# Patient Record
Sex: FEMALE | Race: BLACK OR AFRICAN AMERICAN | NOT HISPANIC OR LATINO | Employment: UNEMPLOYED
[De-identification: names, ages, dates, MRNs, and addresses within clinical notes are randomized per-mention and may not be internally consistent; named-entity substitution may affect disease eponyms.]

---

## 2019-12-31 ENCOUNTER — HOSPITAL (OUTPATIENT)
Dept: OTHER | Age: 24
End: 2019-12-31

## 2020-04-14 ENCOUNTER — INITIAL PRENATAL (OUTPATIENT)
Dept: OBGYN CLINIC | Facility: CLINIC | Age: 25
End: 2020-04-14
Payer: MEDICAID

## 2020-04-14 ENCOUNTER — ULTRASOUND ENCOUNTER (OUTPATIENT)
Dept: OBGYN CLINIC | Facility: CLINIC | Age: 25
End: 2020-04-14
Payer: MEDICAID

## 2020-04-14 VITALS
HEIGHT: 60 IN | HEART RATE: 89 BPM | WEIGHT: 150 LBS | BODY MASS INDEX: 29.45 KG/M2 | SYSTOLIC BLOOD PRESSURE: 120 MMHG | DIASTOLIC BLOOD PRESSURE: 84 MMHG

## 2020-04-14 DIAGNOSIS — Z34.01 ENCOUNTER FOR PRENATAL CARE IN FIRST TRIMESTER OF FIRST PREGNANCY: ICD-10-CM

## 2020-04-14 DIAGNOSIS — O36.80X0 PREGNANCY WITH UNCERTAIN FETAL VIABILITY, SINGLE OR UNSPECIFIED FETUS: ICD-10-CM

## 2020-04-14 DIAGNOSIS — Z12.4 SCREENING FOR MALIGNANT NEOPLASM OF CERVIX: Primary | ICD-10-CM

## 2020-04-14 DIAGNOSIS — O36.80X0 PREGNANCY WITH UNCERTAIN FETAL VIABILITY, SINGLE OR UNSPECIFIED FETUS: Primary | ICD-10-CM

## 2020-04-14 PROCEDURE — 87591 N.GONORRHOEAE DNA AMP PROB: CPT | Performed by: OBSTETRICS & GYNECOLOGY

## 2020-04-14 PROCEDURE — 76801 OB US < 14 WKS SINGLE FETUS: CPT | Performed by: OBSTETRICS & GYNECOLOGY

## 2020-04-14 PROCEDURE — 87491 CHLMYD TRACH DNA AMP PROBE: CPT | Performed by: OBSTETRICS & GYNECOLOGY

## 2020-04-14 PROCEDURE — 87086 URINE CULTURE/COLONY COUNT: CPT | Performed by: OBSTETRICS & GYNECOLOGY

## 2020-04-14 PROCEDURE — 81002 URINALYSIS NONAUTO W/O SCOPE: CPT | Performed by: OBSTETRICS & GYNECOLOGY

## 2020-04-14 PROCEDURE — 88175 CYTOPATH C/V AUTO FLUID REDO: CPT | Performed by: OBSTETRICS & GYNECOLOGY

## 2020-04-14 PROCEDURE — 0500F INITIAL PRENATAL CARE VISIT: CPT | Performed by: OBSTETRICS & GYNECOLOGY

## 2020-04-14 NOTE — PROGRESS NOTES
transabdominal us performed  single viable iup at 8w4d  s=d  ys present  mpn=004 bpm  bilateral ovaries appear within normal limits    taken any medicines during this pregnancy is her first pregnancy she is having some headaches but minimal nausea.   She ha

## 2020-04-17 NOTE — PROGRESS NOTES
Patient aware of results and recommendation to re swab. Patient scheduled for 4/21/2020. Patient verbalized understanding.

## 2020-04-21 ENCOUNTER — LAB ENCOUNTER (OUTPATIENT)
Dept: LAB | Facility: HOSPITAL | Age: 25
End: 2020-04-21
Attending: OBSTETRICS & GYNECOLOGY
Payer: MEDICAID

## 2020-04-21 ENCOUNTER — OFFICE VISIT (OUTPATIENT)
Dept: OBGYN CLINIC | Facility: CLINIC | Age: 25
End: 2020-04-21
Payer: MEDICAID

## 2020-04-21 VITALS
DIASTOLIC BLOOD PRESSURE: 68 MMHG | HEART RATE: 80 BPM | WEIGHT: 153 LBS | SYSTOLIC BLOOD PRESSURE: 118 MMHG | HEIGHT: 60 IN | BODY MASS INDEX: 30.04 KG/M2

## 2020-04-21 DIAGNOSIS — O36.80X0 PREGNANCY WITH UNCERTAIN FETAL VIABILITY, SINGLE OR UNSPECIFIED FETUS: ICD-10-CM

## 2020-04-21 DIAGNOSIS — N76.0 VAGINITIS AND VULVOVAGINITIS: Primary | ICD-10-CM

## 2020-04-21 PROCEDURE — 87510 GARDNER VAG DNA DIR PROBE: CPT | Performed by: OBSTETRICS & GYNECOLOGY

## 2020-04-21 PROCEDURE — 86780 TREPONEMA PALLIDUM: CPT

## 2020-04-21 PROCEDURE — 87340 HEPATITIS B SURFACE AG IA: CPT

## 2020-04-21 PROCEDURE — 86850 RBC ANTIBODY SCREEN: CPT

## 2020-04-21 PROCEDURE — 86900 BLOOD TYPING SEROLOGIC ABO: CPT

## 2020-04-21 PROCEDURE — 86901 BLOOD TYPING SEROLOGIC RH(D): CPT

## 2020-04-21 PROCEDURE — 87480 CANDIDA DNA DIR PROBE: CPT | Performed by: OBSTETRICS & GYNECOLOGY

## 2020-04-21 PROCEDURE — 86762 RUBELLA ANTIBODY: CPT

## 2020-04-21 PROCEDURE — 36415 COLL VENOUS BLD VENIPUNCTURE: CPT

## 2020-04-21 PROCEDURE — 85025 COMPLETE CBC W/AUTO DIFF WBC: CPT

## 2020-04-21 PROCEDURE — 99212 OFFICE O/P EST SF 10 MIN: CPT | Performed by: OBSTETRICS & GYNECOLOGY

## 2020-04-21 PROCEDURE — 87389 HIV-1 AG W/HIV-1&-2 AB AG IA: CPT

## 2020-04-21 PROCEDURE — 87660 TRICHOMONAS VAGIN DIR PROBE: CPT | Performed by: OBSTETRICS & GYNECOLOGY

## 2020-04-21 NOTE — PROGRESS NOTES
The Pap smear done which showed some possible trichomonas she is not symptomatic no vaginal itching discharge at all. At her blood work done today. She we have prenatal vitamins were discussed that they do not have any iron.   General genitalia without le

## 2020-04-21 NOTE — PROGRESS NOTES
Patient aware of results and states that she talked with doctor today at her visit and her PNV do not have iron in them but she did receive ones from our office that do contain iron.  Informed that if she does want to take the ones she has she can always ta

## 2020-04-24 ENCOUNTER — TELEPHONE (OUTPATIENT)
Dept: OBGYN CLINIC | Facility: CLINIC | Age: 25
End: 2020-04-24

## 2020-04-24 RX ORDER — METRONIDAZOLE 250 MG/1
250 TABLET ORAL 3 TIMES DAILY
Qty: 21 TABLET | Refills: 0 | Status: SHIPPED | OUTPATIENT
Start: 2020-04-24 | End: 2020-05-01

## 2020-04-24 NOTE — TELEPHONE ENCOUNTER
----- Message from Olga Caller sent at 4/23/2020  4:16 PM CDT -----  Regarding: pt returning your call  Pt is just returning your call, please call her at this # [phone].  Thanks

## 2020-04-24 NOTE — PROGRESS NOTES
Patient aware of results and recommendations for flagyl 250 TID X 7 days . Patient verbalizes understanding.

## 2020-05-12 ENCOUNTER — ROUTINE PRENATAL (OUTPATIENT)
Dept: OBGYN CLINIC | Facility: CLINIC | Age: 25
End: 2020-05-12
Payer: MEDICAID

## 2020-05-12 ENCOUNTER — ULTRASOUND ENCOUNTER (OUTPATIENT)
Dept: OBGYN CLINIC | Facility: CLINIC | Age: 25
End: 2020-05-12
Payer: MEDICAID

## 2020-05-12 VITALS
DIASTOLIC BLOOD PRESSURE: 68 MMHG | HEIGHT: 60 IN | WEIGHT: 159 LBS | SYSTOLIC BLOOD PRESSURE: 104 MMHG | BODY MASS INDEX: 31.22 KG/M2

## 2020-05-12 DIAGNOSIS — Z36.82 ENCOUNTER FOR NUCHAL TRANSLUCENCY TESTING: Primary | ICD-10-CM

## 2020-05-12 DIAGNOSIS — Z3A.12 12 WEEKS GESTATION OF PREGNANCY: ICD-10-CM

## 2020-05-12 DIAGNOSIS — O99.011 ANEMIA AFFECTING PREGNANCY IN FIRST TRIMESTER: Primary | ICD-10-CM

## 2020-05-12 DIAGNOSIS — Z13.79 ENCOUNTER FOR OTHER SCREENING FOR GENETIC AND CHROMOSOMAL ANOMALIES: ICD-10-CM

## 2020-05-12 DIAGNOSIS — Z13.71 SCREENING FOR GENETIC DISEASE CARRIER STATUS: ICD-10-CM

## 2020-05-12 DIAGNOSIS — Z36.0 ENCOUNTER FOR ANTENATAL SCREENING FOR CHROMOSOMAL ANOMALIES: ICD-10-CM

## 2020-05-12 PROCEDURE — 81002 URINALYSIS NONAUTO W/O SCOPE: CPT | Performed by: OBSTETRICS & GYNECOLOGY

## 2020-05-12 PROCEDURE — 0502F SUBSEQUENT PRENATAL CARE: CPT | Performed by: OBSTETRICS & GYNECOLOGY

## 2020-05-12 NOTE — PROGRESS NOTES
ISABEL     No cramping or vaginal bleeding  No nausea  Minor self-limited headaches    31year old  at 12w4d   NORAH 20 by LMP c/w 8w4d US  B+  Aneuploidy screening  -NT done today - prelim negative.  Discussed Dr. Arelis Stinson has to sign off  -cfDNA ord

## 2020-05-12 NOTE — PATIENT INSTRUCTIONS
Please consider if you would like an AFP level checked (optional) - for more information, see below. Please schedule a \"20 week\" fetal anatomy scan with the  of our office.      AFP Level   There is an optional blood test that we can perform

## 2020-05-20 ENCOUNTER — TELEPHONE (OUTPATIENT)
Dept: OBGYN CLINIC | Facility: CLINIC | Age: 25
End: 2020-05-20

## 2020-05-20 NOTE — PROGRESS NOTES
Prequel Prenatal Screen = Negative for Trisomy 15, 18 and 21  Predicted Fetal Sex available per paper record

## 2020-06-09 ENCOUNTER — TELEPHONE (OUTPATIENT)
Dept: OBGYN CLINIC | Facility: CLINIC | Age: 25
End: 2020-06-09

## 2020-06-09 DIAGNOSIS — Z36.89 ENCOUNTER FOR FETAL ANATOMIC SURVEY: Primary | ICD-10-CM

## 2020-06-09 NOTE — TELEPHONE ENCOUNTER
Per pt she called the medicaid enrollment office and was told that if she wanted to change the medical plan she will have to wait until 07-; but because dr Vicky Hernandez is considered an specialist she needed to go see her pcp first. She is still with the

## 2020-06-10 NOTE — TELEPHONE ENCOUNTER
Submitted prior auth via [employer] for continuity of care - 2 prenatal visits and 20 week ultrasound 91518. Currently under review.  Confirmation [id number], Ref# [id number]

## 2020-06-24 NOTE — TELEPHONE ENCOUNTER
Call placed to [place] to follow up on this request, per Lorenza De La Cruz in customer service, all services were approved:  2 units of 0502F  1 unit of [id number]  Approval #[id number]      Call placed to patient to inform her of the above.  Pt scheduled for 6/30/20 for ob v

## 2020-06-30 ENCOUNTER — ULTRASOUND ENCOUNTER (OUTPATIENT)
Dept: OBGYN CLINIC | Facility: CLINIC | Age: 25
End: 2020-06-30
Payer: MEDICAID

## 2020-06-30 ENCOUNTER — ROUTINE PRENATAL (OUTPATIENT)
Dept: OBGYN CLINIC | Facility: CLINIC | Age: 25
End: 2020-06-30
Payer: MEDICAID

## 2020-06-30 VITALS
SYSTOLIC BLOOD PRESSURE: 102 MMHG | HEIGHT: 60 IN | BODY MASS INDEX: 34.75 KG/M2 | WEIGHT: 177 LBS | DIASTOLIC BLOOD PRESSURE: 60 MMHG

## 2020-06-30 DIAGNOSIS — Z36.89 ENCOUNTER FOR FETAL ANATOMIC SURVEY: ICD-10-CM

## 2020-06-30 DIAGNOSIS — Z34.02 ENCOUNTER FOR SUPERVISION OF NORMAL FIRST PREGNANCY IN SECOND TRIMESTER: Primary | ICD-10-CM

## 2020-06-30 PROCEDURE — 76805 OB US >/= 14 WKS SNGL FETUS: CPT | Performed by: OBSTETRICS & GYNECOLOGY

## 2020-06-30 PROCEDURE — 0502F SUBSEQUENT PRENATAL CARE: CPT | Performed by: OBSTETRICS & GYNECOLOGY

## 2020-06-30 PROCEDURE — 81002 URINALYSIS NONAUTO W/O SCOPE: CPT | Performed by: OBSTETRICS & GYNECOLOGY

## 2020-06-30 NOTE — PROGRESS NOTES
single viable iup at 31L3M  cephalic  NAC=2.7 cm  ant placenta  s=d  vqt=567 bpm  normal anatomy and carol

## 2020-07-02 ENCOUNTER — TELEPHONE (OUTPATIENT)
Dept: OBGYN CLINIC | Facility: CLINIC | Age: 25
End: 2020-07-02

## 2020-07-02 NOTE — TELEPHONE ENCOUNTER
Need a letter for work stating  pt was in the office on Tuesday 06/30.had ob appt w/ .pt is able  to work w/o any  Restrictions. please send letter via my chart.

## 2020-07-28 ENCOUNTER — ROUTINE PRENATAL (OUTPATIENT)
Dept: OBGYN CLINIC | Facility: CLINIC | Age: 25
End: 2020-07-28
Payer: MEDICAID

## 2020-07-28 ENCOUNTER — LAB ENCOUNTER (OUTPATIENT)
Dept: LAB | Facility: HOSPITAL | Age: 25
End: 2020-07-28
Attending: OBSTETRICS & GYNECOLOGY
Payer: MEDICAID

## 2020-07-28 VITALS
SYSTOLIC BLOOD PRESSURE: 115 MMHG | BODY MASS INDEX: 36.12 KG/M2 | DIASTOLIC BLOOD PRESSURE: 62 MMHG | WEIGHT: 184 LBS | HEIGHT: 60 IN

## 2020-07-28 DIAGNOSIS — O99.012 ANEMIA COMPLICATING PREGNANCY IN SECOND TRIMESTER: ICD-10-CM

## 2020-07-28 DIAGNOSIS — Z34.02 ENCOUNTER FOR SUPERVISION OF NORMAL FIRST PREGNANCY IN SECOND TRIMESTER: Primary | ICD-10-CM

## 2020-07-28 DIAGNOSIS — Z3A.23 23 WEEKS GESTATION OF PREGNANCY: ICD-10-CM

## 2020-07-28 DIAGNOSIS — Z34.02 ENCOUNTER FOR SUPERVISION OF NORMAL FIRST PREGNANCY IN SECOND TRIMESTER: ICD-10-CM

## 2020-07-28 LAB
BASOPHILS # BLD AUTO: 0.03 X10(3) UL (ref 0–0.2)
BASOPHILS NFR BLD AUTO: 0.3 %
DEPRECATED RDW RBC AUTO: 45.1 FL (ref 35.1–46.3)
EOSINOPHIL # BLD AUTO: 0.08 X10(3) UL (ref 0–0.7)
EOSINOPHIL NFR BLD AUTO: 0.7 %
ERYTHROCYTE [DISTWIDTH] IN BLOOD BY AUTOMATED COUNT: 13.1 % (ref 11–15)
GLUCOSE 1H P GLC SERPL-MCNC: 94 MG/DL
HCT VFR BLD AUTO: 32.7 % (ref 35–48)
HGB BLD-MCNC: 10.8 G/DL (ref 12–16)
IMM GRANULOCYTES # BLD AUTO: 0.11 X10(3) UL (ref 0–1)
IMM GRANULOCYTES NFR BLD: 1 %
LYMPHOCYTES # BLD AUTO: 1.73 X10(3) UL (ref 1–4)
LYMPHOCYTES NFR BLD AUTO: 15.6 %
MCH RBC QN AUTO: 31.5 PG (ref 26–34)
MCHC RBC AUTO-ENTMCNC: 33 G/DL (ref 31–37)
MCV RBC AUTO: 95.3 FL (ref 80–100)
MONOCYTES # BLD AUTO: 0.89 X10(3) UL (ref 0.1–1)
MONOCYTES NFR BLD AUTO: 8 %
MULTISTIX EXPIRATION DATE: NORMAL DATE
MULTISTIX LOT#: NORMAL NUMERIC
NEUTROPHILS # BLD AUTO: 8.22 X10 (3) UL (ref 1.5–7.7)
NEUTROPHILS # BLD AUTO: 8.22 X10(3) UL (ref 1.5–7.7)
NEUTROPHILS NFR BLD AUTO: 74.4 %
PLATELET # BLD AUTO: 220 10(3)UL (ref 150–450)
RBC # BLD AUTO: 3.43 X10(6)UL (ref 3.8–5.3)
WBC # BLD AUTO: 11.1 X10(3) UL (ref 4–11)

## 2020-07-28 PROCEDURE — 81002 URINALYSIS NONAUTO W/O SCOPE: CPT | Performed by: OBSTETRICS & GYNECOLOGY

## 2020-07-28 PROCEDURE — 3008F BODY MASS INDEX DOCD: CPT | Performed by: OBSTETRICS & GYNECOLOGY

## 2020-07-28 PROCEDURE — 36415 COLL VENOUS BLD VENIPUNCTURE: CPT

## 2020-07-28 PROCEDURE — 85025 COMPLETE CBC W/AUTO DIFF WBC: CPT

## 2020-07-28 PROCEDURE — 82950 GLUCOSE TEST: CPT

## 2020-07-28 PROCEDURE — 0502F SUBSEQUENT PRENATAL CARE: CPT | Performed by: OBSTETRICS & GYNECOLOGY

## 2020-07-28 PROCEDURE — 3078F DIAST BP <80 MM HG: CPT | Performed by: OBSTETRICS & GYNECOLOGY

## 2020-07-28 PROCEDURE — 3074F SYST BP LT 130 MM HG: CPT | Performed by: OBSTETRICS & GYNECOLOGY

## 2020-07-28 PROCEDURE — 82728 ASSAY OF FERRITIN: CPT

## 2020-07-29 PROBLEM — O99.012 ANEMIA AFFECTING PREGNANCY IN SECOND TRIMESTER (HCC): Status: ACTIVE | Noted: 2020-05-12

## 2020-07-29 PROBLEM — Z34.02 ENCOUNTER FOR PRENATAL CARE IN SECOND TRIMESTER OF FIRST PREGNANCY: Status: ACTIVE | Noted: 2020-04-14

## 2020-07-29 PROBLEM — Z34.02: Status: ACTIVE | Noted: 2020-04-14

## 2020-07-29 PROBLEM — O99.012 ANEMIA AFFECTING PREGNANCY IN SECOND TRIMESTER: Status: ACTIVE | Noted: 2020-05-12

## 2020-07-29 LAB — DEPRECATED HBV CORE AB SER IA-ACNC: 6.5 NG/ML (ref 12–114)

## 2020-07-30 PROBLEM — D50.9 IRON DEFICIENCY ANEMIA DURING PREGNANCY: Status: ACTIVE | Noted: 2020-05-12

## 2020-07-30 PROBLEM — O99.019 IRON DEFICIENCY ANEMIA DURING PREGNANCY: Status: ACTIVE | Noted: 2020-05-12

## 2020-07-30 PROBLEM — O99.019 IRON DEFICIENCY ANEMIA DURING PREGNANCY (HCC): Status: ACTIVE | Noted: 2020-05-12

## 2020-07-30 PROBLEM — D50.9 IRON DEFICIENCY ANEMIA DURING PREGNANCY (HCC): Status: ACTIVE | Noted: 2020-05-12

## 2020-08-20 ENCOUNTER — ROUTINE PRENATAL (OUTPATIENT)
Dept: OBGYN CLINIC | Facility: CLINIC | Age: 25
End: 2020-08-20
Payer: MEDICAID

## 2020-08-20 VITALS
HEART RATE: 99 BPM | DIASTOLIC BLOOD PRESSURE: 66 MMHG | WEIGHT: 192 LBS | BODY MASS INDEX: 37.69 KG/M2 | HEIGHT: 60 IN | SYSTOLIC BLOOD PRESSURE: 110 MMHG

## 2020-08-20 DIAGNOSIS — Z34.02 ENCOUNTER FOR SUPERVISION OF NORMAL FIRST PREGNANCY IN SECOND TRIMESTER: Primary | ICD-10-CM

## 2020-08-20 DIAGNOSIS — Z3A.26 26 WEEKS GESTATION OF PREGNANCY: ICD-10-CM

## 2020-08-20 LAB
MULTISTIX LOT#: NORMAL NUMERIC
PROTEIN (URINE DIPSTICK): NEGATIVE MG/DL

## 2020-08-20 PROCEDURE — 3078F DIAST BP <80 MM HG: CPT | Performed by: OBSTETRICS & GYNECOLOGY

## 2020-08-20 PROCEDURE — 0502F SUBSEQUENT PRENATAL CARE: CPT | Performed by: OBSTETRICS & GYNECOLOGY

## 2020-08-20 PROCEDURE — 3074F SYST BP LT 130 MM HG: CPT | Performed by: OBSTETRICS & GYNECOLOGY

## 2020-08-20 PROCEDURE — 3008F BODY MASS INDEX DOCD: CPT | Performed by: OBSTETRICS & GYNECOLOGY

## 2020-08-20 PROCEDURE — 81002 URINALYSIS NONAUTO W/O SCOPE: CPT | Performed by: OBSTETRICS & GYNECOLOGY

## 2020-09-01 ENCOUNTER — LAB ENCOUNTER (OUTPATIENT)
Dept: LAB | Facility: HOSPITAL | Age: 25
End: 2020-09-01
Attending: OBSTETRICS & GYNECOLOGY
Payer: MEDICAID

## 2020-09-01 DIAGNOSIS — O99.012 ANEMIA COMPLICATING PREGNANCY IN SECOND TRIMESTER: ICD-10-CM

## 2020-09-01 DIAGNOSIS — Z34.02 ENCOUNTER FOR SUPERVISION OF NORMAL FIRST PREGNANCY IN SECOND TRIMESTER: ICD-10-CM

## 2020-09-01 LAB
BASOPHILS # BLD AUTO: 0.04 X10(3) UL (ref 0–0.2)
BASOPHILS NFR BLD AUTO: 0.4 %
DEPRECATED RDW RBC AUTO: 46.1 FL (ref 35.1–46.3)
EOSINOPHIL # BLD AUTO: 0.09 X10(3) UL (ref 0–0.7)
EOSINOPHIL NFR BLD AUTO: 0.9 %
ERYTHROCYTE [DISTWIDTH] IN BLOOD BY AUTOMATED COUNT: 12.9 % (ref 11–15)
HCT VFR BLD AUTO: 34.4 % (ref 35–48)
HGB BLD-MCNC: 10.9 G/DL (ref 12–16)
IMM GRANULOCYTES # BLD AUTO: 0.13 X10(3) UL (ref 0–1)
IMM GRANULOCYTES NFR BLD: 1.3 %
LYMPHOCYTES # BLD AUTO: 1.75 X10(3) UL (ref 1–4)
LYMPHOCYTES NFR BLD AUTO: 17 %
MCH RBC QN AUTO: 31.2 PG (ref 26–34)
MCHC RBC AUTO-ENTMCNC: 31.7 G/DL (ref 31–37)
MCV RBC AUTO: 98.6 FL (ref 80–100)
MONOCYTES # BLD AUTO: 0.6 X10(3) UL (ref 0.1–1)
MONOCYTES NFR BLD AUTO: 5.8 %
NEUTROPHILS # BLD AUTO: 7.66 X10 (3) UL (ref 1.5–7.7)
NEUTROPHILS # BLD AUTO: 7.66 X10(3) UL (ref 1.5–7.7)
NEUTROPHILS NFR BLD AUTO: 74.6 %
PLATELET # BLD AUTO: 217 10(3)UL (ref 150–450)
RBC # BLD AUTO: 3.49 X10(6)UL (ref 3.8–5.3)
WBC # BLD AUTO: 10.3 X10(3) UL (ref 4–11)

## 2020-09-01 PROCEDURE — 36415 COLL VENOUS BLD VENIPUNCTURE: CPT

## 2020-09-01 PROCEDURE — 85025 COMPLETE CBC W/AUTO DIFF WBC: CPT

## 2020-09-01 PROCEDURE — 87389 HIV-1 AG W/HIV-1&-2 AB AG IA: CPT

## 2020-09-03 ENCOUNTER — ROUTINE PRENATAL (OUTPATIENT)
Dept: OBGYN CLINIC | Facility: CLINIC | Age: 25
End: 2020-09-03
Payer: MEDICAID

## 2020-09-03 ENCOUNTER — TELEPHONE (OUTPATIENT)
Dept: OBGYN CLINIC | Facility: CLINIC | Age: 25
End: 2020-09-03

## 2020-09-03 VITALS
BODY MASS INDEX: 38.48 KG/M2 | SYSTOLIC BLOOD PRESSURE: 120 MMHG | WEIGHT: 196 LBS | DIASTOLIC BLOOD PRESSURE: 60 MMHG | HEIGHT: 60 IN

## 2020-09-03 DIAGNOSIS — O99.019 IRON DEFICIENCY ANEMIA DURING PREGNANCY: ICD-10-CM

## 2020-09-03 DIAGNOSIS — Z34.02 ENCOUNTER FOR SUPERVISION OF NORMAL FIRST PREGNANCY IN SECOND TRIMESTER: ICD-10-CM

## 2020-09-03 DIAGNOSIS — Z34.03 ENCOUNTER FOR PRENATAL CARE IN THIRD TRIMESTER OF FIRST PREGNANCY: ICD-10-CM

## 2020-09-03 DIAGNOSIS — O26.03 EXCESSIVE WEIGHT GAIN DURING PREGNANCY IN THIRD TRIMESTER: Primary | ICD-10-CM

## 2020-09-03 DIAGNOSIS — D50.9 IRON DEFICIENCY ANEMIA DURING PREGNANCY: ICD-10-CM

## 2020-09-03 LAB — MULTISTIX LOT#: NORMAL NUMERIC

## 2020-09-03 PROCEDURE — 3008F BODY MASS INDEX DOCD: CPT | Performed by: OBSTETRICS & GYNECOLOGY

## 2020-09-03 PROCEDURE — 3078F DIAST BP <80 MM HG: CPT | Performed by: OBSTETRICS & GYNECOLOGY

## 2020-09-03 PROCEDURE — 81002 URINALYSIS NONAUTO W/O SCOPE: CPT | Performed by: OBSTETRICS & GYNECOLOGY

## 2020-09-03 PROCEDURE — 3074F SYST BP LT 130 MM HG: CPT | Performed by: OBSTETRICS & GYNECOLOGY

## 2020-09-03 PROCEDURE — 0502F SUBSEQUENT PRENATAL CARE: CPT | Performed by: OBSTETRICS & GYNECOLOGY

## 2020-09-03 NOTE — PROGRESS NOTES
ISABEL   +FM. Occasional leg cramps. Not every day.      31year old  at 30w11d   NORAH 20 by LMP c/w 8w4d US  B+  Female fetus  Carrier screening neg   cfDNA neg & NT normal     Anemia in 1st trimester  -Hb 11.2 on 2020, Hb 10.8 (2020), Hb 10

## 2020-09-03 NOTE — PATIENT INSTRUCTIONS
Magnesium oxide 250-500 mg nightly   Or   Magnesium glycinate 250-500 mg nightly    Oxide is easier to find, cheaper, fairly well absorbed  Glycinate is harder to find, more expensive, but better absorbed, may have less GI side effects    Probably best for

## 2020-09-16 NOTE — PROGRESS NOTES
Carisa Sosa,  Anemia still present. Essentially stable. You can take prenatal vitamin with iron consistently every day (instead of every other day), this may improve.  Also you can add in some extra iron (ferrous sulfate 325 mg) tablet daily at least 4 ho

## 2020-09-17 ENCOUNTER — ROUTINE PRENATAL (OUTPATIENT)
Dept: OBGYN CLINIC | Facility: CLINIC | Age: 25
End: 2020-09-17
Payer: MEDICAID

## 2020-09-17 VITALS
BODY MASS INDEX: 38.87 KG/M2 | HEIGHT: 60 IN | DIASTOLIC BLOOD PRESSURE: 64 MMHG | HEART RATE: 99 BPM | WEIGHT: 198 LBS | SYSTOLIC BLOOD PRESSURE: 122 MMHG

## 2020-09-17 DIAGNOSIS — Z34.03 ENCOUNTER FOR SUPERVISION OF NORMAL FIRST PREGNANCY IN THIRD TRIMESTER: Primary | ICD-10-CM

## 2020-09-17 DIAGNOSIS — Z3A.30 30 WEEKS GESTATION OF PREGNANCY: ICD-10-CM

## 2020-09-17 LAB
MULTISTIX LOT#: NORMAL NUMERIC
PROTEIN (URINE DIPSTICK): 30 MG/DL

## 2020-09-17 PROCEDURE — 81002 URINALYSIS NONAUTO W/O SCOPE: CPT | Performed by: OBSTETRICS & GYNECOLOGY

## 2020-09-17 PROCEDURE — 3078F DIAST BP <80 MM HG: CPT | Performed by: OBSTETRICS & GYNECOLOGY

## 2020-09-17 PROCEDURE — 3008F BODY MASS INDEX DOCD: CPT | Performed by: OBSTETRICS & GYNECOLOGY

## 2020-09-17 PROCEDURE — 0502F SUBSEQUENT PRENATAL CARE: CPT | Performed by: OBSTETRICS & GYNECOLOGY

## 2020-09-17 PROCEDURE — 3074F SYST BP LT 130 MM HG: CPT | Performed by: OBSTETRICS & GYNECOLOGY

## 2020-09-24 ENCOUNTER — TELEPHONE (OUTPATIENT)
Dept: OBGYN CLINIC | Facility: CLINIC | Age: 25
End: 2020-09-24

## 2020-09-24 NOTE — TELEPHONE ENCOUNTER
Spoke with patient. She was standing to do laundry yesterday and feet are swollen today. I had her check for homans sign. Patient just complains of tightness no pain or redness.  Instructed patient to increase fliud intake, have frequent periods of rest wit

## 2020-10-01 ENCOUNTER — ROUTINE PRENATAL (OUTPATIENT)
Dept: OBGYN CLINIC | Facility: CLINIC | Age: 25
End: 2020-10-01
Payer: MEDICAID

## 2020-10-01 VITALS
BODY MASS INDEX: 39.46 KG/M2 | SYSTOLIC BLOOD PRESSURE: 102 MMHG | HEIGHT: 60 IN | DIASTOLIC BLOOD PRESSURE: 58 MMHG | WEIGHT: 201 LBS

## 2020-10-01 DIAGNOSIS — Z28.21 INFLUENZA VACCINATION DECLINED: ICD-10-CM

## 2020-10-01 DIAGNOSIS — O26.03 EXCESSIVE WEIGHT GAIN DURING PREGNANCY IN THIRD TRIMESTER: Primary | ICD-10-CM

## 2020-10-01 DIAGNOSIS — O99.019 IRON DEFICIENCY ANEMIA DURING PREGNANCY: ICD-10-CM

## 2020-10-01 DIAGNOSIS — Z34.03 ENCOUNTER FOR PRENATAL CARE IN THIRD TRIMESTER OF FIRST PREGNANCY: ICD-10-CM

## 2020-10-01 DIAGNOSIS — D50.9 IRON DEFICIENCY ANEMIA DURING PREGNANCY: ICD-10-CM

## 2020-10-01 DIAGNOSIS — O12.03 EDEMA DURING PREGNANCY IN THIRD TRIMESTER: ICD-10-CM

## 2020-10-01 DIAGNOSIS — Z23 NEED FOR TDAP VACCINATION: ICD-10-CM

## 2020-10-01 PROCEDURE — 0502F SUBSEQUENT PRENATAL CARE: CPT | Performed by: OBSTETRICS & GYNECOLOGY

## 2020-10-01 PROCEDURE — 3008F BODY MASS INDEX DOCD: CPT | Performed by: OBSTETRICS & GYNECOLOGY

## 2020-10-01 PROCEDURE — 3078F DIAST BP <80 MM HG: CPT | Performed by: OBSTETRICS & GYNECOLOGY

## 2020-10-01 PROCEDURE — 90715 TDAP VACCINE 7 YRS/> IM: CPT | Performed by: OBSTETRICS & GYNECOLOGY

## 2020-10-01 PROCEDURE — 84156 ASSAY OF PROTEIN URINE: CPT | Performed by: OBSTETRICS & GYNECOLOGY

## 2020-10-01 PROCEDURE — 3074F SYST BP LT 130 MM HG: CPT | Performed by: OBSTETRICS & GYNECOLOGY

## 2020-10-01 PROCEDURE — 90471 IMMUNIZATION ADMIN: CPT | Performed by: OBSTETRICS & GYNECOLOGY

## 2020-10-01 PROCEDURE — 82570 ASSAY OF URINE CREATININE: CPT | Performed by: OBSTETRICS & GYNECOLOGY

## 2020-10-01 PROCEDURE — 81002 URINALYSIS NONAUTO W/O SCOPE: CPT | Performed by: OBSTETRICS & GYNECOLOGY

## 2020-10-01 NOTE — PROGRESS NOTES
ISABEL     +FM. Leg cramping is better. Has been taking extra PO iron. Legs are getting very swollen lately. Feels it in hands as well. No HA, vision changes, epigastric pain. Frequent Isiah Colindres contractions.  Just feeling very tired & uncomfortable in gen

## 2020-10-15 ENCOUNTER — ROUTINE PRENATAL (OUTPATIENT)
Dept: OBGYN CLINIC | Facility: CLINIC | Age: 25
End: 2020-10-15
Payer: MEDICAID

## 2020-10-15 VITALS
WEIGHT: 208 LBS | BODY MASS INDEX: 40.84 KG/M2 | SYSTOLIC BLOOD PRESSURE: 112 MMHG | DIASTOLIC BLOOD PRESSURE: 58 MMHG | HEIGHT: 60 IN

## 2020-10-15 DIAGNOSIS — Z34.03 ENCOUNTER FOR SUPERVISION OF NORMAL FIRST PREGNANCY IN THIRD TRIMESTER: Primary | ICD-10-CM

## 2020-10-15 PROCEDURE — 3074F SYST BP LT 130 MM HG: CPT | Performed by: OBSTETRICS & GYNECOLOGY

## 2020-10-15 PROCEDURE — 0502F SUBSEQUENT PRENATAL CARE: CPT | Performed by: OBSTETRICS & GYNECOLOGY

## 2020-10-15 PROCEDURE — 3078F DIAST BP <80 MM HG: CPT | Performed by: OBSTETRICS & GYNECOLOGY

## 2020-10-15 PROCEDURE — 3008F BODY MASS INDEX DOCD: CPT | Performed by: OBSTETRICS & GYNECOLOGY

## 2020-10-15 PROCEDURE — 81002 URINALYSIS NONAUTO W/O SCOPE: CPT | Performed by: OBSTETRICS & GYNECOLOGY

## 2020-10-15 NOTE — PROGRESS NOTES
Patient c/o dry skin patch on her right hand - looks like eczema - hydrocortisone prn  - patient plans to go to the lab today for CBc and 24 hour urine

## 2020-10-19 ENCOUNTER — APPOINTMENT (OUTPATIENT)
Dept: ULTRASOUND IMAGING | Facility: HOSPITAL | Age: 25
End: 2020-10-19
Attending: OBSTETRICS & GYNECOLOGY
Payer: MEDICAID

## 2020-10-19 ENCOUNTER — HOSPITAL ENCOUNTER (OUTPATIENT)
Facility: HOSPITAL | Age: 25
Setting detail: OBSERVATION
Discharge: HOME | End: 2020-10-19
Attending: OBSTETRICS & GYNECOLOGY | Admitting: OBSTETRICS & GYNECOLOGY
Payer: MEDICAID

## 2020-10-19 ENCOUNTER — TELEPHONE (OUTPATIENT)
Dept: OBGYN CLINIC | Facility: CLINIC | Age: 25
End: 2020-10-19

## 2020-10-19 VITALS
RESPIRATION RATE: 18 BRPM | WEIGHT: 208 LBS | BODY MASS INDEX: 40.84 KG/M2 | SYSTOLIC BLOOD PRESSURE: 120 MMHG | DIASTOLIC BLOOD PRESSURE: 60 MMHG | HEART RATE: 106 BPM | HEIGHT: 60 IN | TEMPERATURE: 98 F

## 2020-10-19 PROBLEM — Z34.90 PREGNANCY (HCC): Status: ACTIVE | Noted: 2020-10-19

## 2020-10-19 PROBLEM — Z34.90 PREGNANCY: Status: ACTIVE | Noted: 2020-10-19

## 2020-10-19 PROCEDURE — 76815 OB US LIMITED FETUS(S): CPT | Performed by: OBSTETRICS & GYNECOLOGY

## 2020-10-19 PROCEDURE — 59025 FETAL NON-STRESS TEST: CPT

## 2020-10-19 PROCEDURE — 99214 OFFICE O/P EST MOD 30 MIN: CPT

## 2020-10-19 NOTE — PROGRESS NOTES
Pt , edc 20 (35 3/7wks) admitted to triage rm 3 with c/o leaking fluid. Pt states she was sitting in the car and felt a big gush of fluid yesterday at 1930. Pt states she continues to feel wetness since, not enough to wear a peripad.  Pt states +

## 2020-10-19 NOTE — TELEPHONE ENCOUNTER
Spoke with patient, possible ROM. L&D aware. Patient sent to L&D. Dr Dominique Mcclure aware. Understanding verbalized.

## 2020-10-19 NOTE — TELEPHONE ENCOUNTER
Has a 24 hr urine. should she get labs before  the 24 hrs urine? Mucus plug coming apart. is that a good?

## 2020-10-20 ENCOUNTER — LAB ENCOUNTER (OUTPATIENT)
Dept: LAB | Facility: HOSPITAL | Age: 25
End: 2020-10-20
Attending: OBSTETRICS & GYNECOLOGY
Payer: MEDICAID

## 2020-10-20 DIAGNOSIS — D50.9 IRON DEFICIENCY ANEMIA DURING PREGNANCY: ICD-10-CM

## 2020-10-20 DIAGNOSIS — O99.019 IRON DEFICIENCY ANEMIA DURING PREGNANCY: ICD-10-CM

## 2020-10-20 DIAGNOSIS — Z34.03 ENCOUNTER FOR PRENATAL CARE IN THIRD TRIMESTER OF FIRST PREGNANCY: ICD-10-CM

## 2020-10-20 DIAGNOSIS — O26.03 EXCESSIVE WEIGHT GAIN DURING PREGNANCY IN THIRD TRIMESTER: ICD-10-CM

## 2020-10-20 DIAGNOSIS — O12.03 EDEMA DURING PREGNANCY IN THIRD TRIMESTER: ICD-10-CM

## 2020-10-20 PROCEDURE — 85025 COMPLETE CBC W/AUTO DIFF WBC: CPT

## 2020-10-20 PROCEDURE — 82728 ASSAY OF FERRITIN: CPT

## 2020-10-20 PROCEDURE — 80053 COMPREHEN METABOLIC PANEL: CPT

## 2020-10-20 PROCEDURE — 84156 ASSAY OF PROTEIN URINE: CPT

## 2020-10-20 PROCEDURE — 36415 COLL VENOUS BLD VENIPUNCTURE: CPT

## 2020-10-20 PROCEDURE — 84550 ASSAY OF BLOOD/URIC ACID: CPT

## 2020-10-20 NOTE — NST
Nonstress Test   Patient: Prasanth Balderrama    Gestation: 35w3d    NST:       Variability: Moderate           Accelerations: Yes           Decelerations: None            Baseline: 135 BPM           Uterine Irritability: Yes           Contr

## 2020-10-21 NOTE — PROGRESS NOTES
Patient aware of results and recommendations to continue iron supplement and increase water intake. She did collect all urine in 24 hours, did not miss any. Verbalizes understanding.

## 2020-10-26 ENCOUNTER — ROUTINE PRENATAL (OUTPATIENT)
Dept: OBGYN CLINIC | Facility: CLINIC | Age: 25
End: 2020-10-26
Payer: MEDICAID

## 2020-10-26 VITALS
SYSTOLIC BLOOD PRESSURE: 118 MMHG | DIASTOLIC BLOOD PRESSURE: 58 MMHG | BODY MASS INDEX: 41.43 KG/M2 | WEIGHT: 211 LBS | HEIGHT: 60 IN

## 2020-10-26 DIAGNOSIS — Z34.03 ENCOUNTER FOR SUPERVISION OF NORMAL FIRST PREGNANCY IN THIRD TRIMESTER: Primary | ICD-10-CM

## 2020-10-26 DIAGNOSIS — Z3A.36 36 WEEKS GESTATION OF PREGNANCY: ICD-10-CM

## 2020-10-26 PROCEDURE — 87081 CULTURE SCREEN ONLY: CPT | Performed by: OBSTETRICS & GYNECOLOGY

## 2020-10-26 PROCEDURE — 3008F BODY MASS INDEX DOCD: CPT | Performed by: OBSTETRICS & GYNECOLOGY

## 2020-10-26 PROCEDURE — 1111F DSCHRG MED/CURRENT MED MERGE: CPT | Performed by: OBSTETRICS & GYNECOLOGY

## 2020-10-26 PROCEDURE — 3074F SYST BP LT 130 MM HG: CPT | Performed by: OBSTETRICS & GYNECOLOGY

## 2020-10-26 PROCEDURE — 0502F SUBSEQUENT PRENATAL CARE: CPT | Performed by: OBSTETRICS & GYNECOLOGY

## 2020-10-26 PROCEDURE — 3078F DIAST BP <80 MM HG: CPT | Performed by: OBSTETRICS & GYNECOLOGY

## 2020-10-26 PROCEDURE — 81002 URINALYSIS NONAUTO W/O SCOPE: CPT | Performed by: OBSTETRICS & GYNECOLOGY

## 2020-10-26 NOTE — PROGRESS NOTES
Patient has no complaints  - GBS done  - cf DNA and carrier screen negative Girl  1. Excessive weight gain  - 61 lb to date  2.  Anemia  - improved HB - 11.7

## 2020-10-29 ENCOUNTER — TELEPHONE (OUTPATIENT)
Dept: OBGYN CLINIC | Facility: CLINIC | Age: 25
End: 2020-10-29

## 2020-10-29 NOTE — TELEPHONE ENCOUNTER
Would like to speak to nurse to see if she can get a dr note to get a few days off and she has not been approved for maternity leave yet nut she is experiencing yang brown.

## 2020-10-30 ENCOUNTER — PATIENT MESSAGE (OUTPATIENT)
Dept: OBGYN CLINIC | Facility: CLINIC | Age: 25
End: 2020-10-30

## 2020-11-02 ENCOUNTER — ROUTINE PRENATAL (OUTPATIENT)
Dept: OBGYN CLINIC | Facility: CLINIC | Age: 25
End: 2020-11-02
Payer: MEDICAID

## 2020-11-02 VITALS
SYSTOLIC BLOOD PRESSURE: 104 MMHG | BODY MASS INDEX: 42.01 KG/M2 | DIASTOLIC BLOOD PRESSURE: 68 MMHG | WEIGHT: 214 LBS | HEIGHT: 60 IN

## 2020-11-02 DIAGNOSIS — Z28.21 INFLUENZA VACCINATION DECLINED: ICD-10-CM

## 2020-11-02 DIAGNOSIS — Z34.03 ENCOUNTER FOR PRENATAL CARE IN THIRD TRIMESTER OF FIRST PREGNANCY: ICD-10-CM

## 2020-11-02 DIAGNOSIS — O99.019 IRON DEFICIENCY ANEMIA DURING PREGNANCY: ICD-10-CM

## 2020-11-02 DIAGNOSIS — O12.03 EDEMA DURING PREGNANCY IN THIRD TRIMESTER: Primary | ICD-10-CM

## 2020-11-02 DIAGNOSIS — O26.03 EXCESSIVE WEIGHT GAIN DURING PREGNANCY IN THIRD TRIMESTER: ICD-10-CM

## 2020-11-02 DIAGNOSIS — D50.9 IRON DEFICIENCY ANEMIA DURING PREGNANCY: ICD-10-CM

## 2020-11-02 PROCEDURE — 3074F SYST BP LT 130 MM HG: CPT | Performed by: OBSTETRICS & GYNECOLOGY

## 2020-11-02 PROCEDURE — 3008F BODY MASS INDEX DOCD: CPT | Performed by: OBSTETRICS & GYNECOLOGY

## 2020-11-02 PROCEDURE — 3078F DIAST BP <80 MM HG: CPT | Performed by: OBSTETRICS & GYNECOLOGY

## 2020-11-02 PROCEDURE — 0502F SUBSEQUENT PRENATAL CARE: CPT | Performed by: OBSTETRICS & GYNECOLOGY

## 2020-11-02 PROCEDURE — 81002 URINALYSIS NONAUTO W/O SCOPE: CPT | Performed by: OBSTETRICS & GYNECOLOGY

## 2020-11-02 NOTE — TELEPHONE ENCOUNTER
Spoke with patient. She was here for her scheduled appointment. She is requesting a letter to be off of work. Spoke with Dr Zuleyka Fuller and there is no medical indication for that request. Scheduled for an IOL pitocin on 11/18/2020 at 8am per DR SOTELO.  Patient

## 2020-11-02 NOTE — PROGRESS NOTES
ISABEL   +FM. Some contractions occasionally. No VB or LOF.      31year old  at 37w3d   NORAH 20 by LMP c/w 8w4d US  B+  Female fetus  Carrier screening neg   cfDNA neg & NT normal     Anemia in 1st trimester  -Hb 11.2 on 2020, Hb 10.8 (2020

## 2020-11-03 DIAGNOSIS — Z34.90 PREGNANCY: Primary | ICD-10-CM

## 2020-11-11 ENCOUNTER — ROUTINE PRENATAL (OUTPATIENT)
Dept: OBGYN CLINIC | Facility: CLINIC | Age: 25
End: 2020-11-11
Payer: MEDICAID

## 2020-11-11 VITALS
DIASTOLIC BLOOD PRESSURE: 74 MMHG | WEIGHT: 212 LBS | SYSTOLIC BLOOD PRESSURE: 122 MMHG | HEIGHT: 60 IN | BODY MASS INDEX: 41.62 KG/M2

## 2020-11-11 DIAGNOSIS — Z3A.38 38 WEEKS GESTATION OF PREGNANCY: ICD-10-CM

## 2020-11-11 DIAGNOSIS — Z34.03 ENCOUNTER FOR SUPERVISION OF NORMAL FIRST PREGNANCY IN THIRD TRIMESTER: Primary | ICD-10-CM

## 2020-11-11 PROBLEM — Z22.330 GBS CARRIER: Status: ACTIVE | Noted: 2020-11-11

## 2020-11-11 PROCEDURE — 3078F DIAST BP <80 MM HG: CPT | Performed by: OBSTETRICS & GYNECOLOGY

## 2020-11-11 PROCEDURE — 3074F SYST BP LT 130 MM HG: CPT | Performed by: OBSTETRICS & GYNECOLOGY

## 2020-11-11 PROCEDURE — 0502F SUBSEQUENT PRENATAL CARE: CPT | Performed by: OBSTETRICS & GYNECOLOGY

## 2020-11-11 PROCEDURE — 3008F BODY MASS INDEX DOCD: CPT | Performed by: OBSTETRICS & GYNECOLOGY

## 2020-11-11 PROCEDURE — 81002 URINALYSIS NONAUTO W/O SCOPE: CPT | Performed by: OBSTETRICS & GYNECOLOGY

## 2020-11-15 ENCOUNTER — ANESTHESIA EVENT (OUTPATIENT)
Dept: OBGYN UNIT | Facility: HOSPITAL | Age: 25
End: 2020-11-15
Payer: MEDICAID

## 2020-11-15 ENCOUNTER — APPOINTMENT (OUTPATIENT)
Dept: LAB | Facility: HOSPITAL | Age: 25
End: 2020-11-15
Attending: OBSTETRICS & GYNECOLOGY
Payer: MEDICAID

## 2020-11-15 ENCOUNTER — HOSPITAL ENCOUNTER (INPATIENT)
Facility: HOSPITAL | Age: 25
LOS: 3 days | Discharge: HOME | End: 2020-11-18
Attending: OBSTETRICS & GYNECOLOGY | Admitting: OBSTETRICS & GYNECOLOGY
Payer: MEDICAID

## 2020-11-15 ENCOUNTER — ANESTHESIA (OUTPATIENT)
Dept: OBGYN UNIT | Facility: HOSPITAL | Age: 25
End: 2020-11-15
Payer: MEDICAID

## 2020-11-15 DIAGNOSIS — Z34.90 PREGNANCY: ICD-10-CM

## 2020-11-15 PROBLEM — O16.3 ELEVATED BLOOD PRESSURE AFFECTING PREGNANCY IN THIRD TRIMESTER, ANTEPARTUM (HCC): Status: ACTIVE | Noted: 2020-11-15

## 2020-11-15 PROBLEM — O42.90 AMNIOTIC FLUID LEAKING (HCC): Status: ACTIVE | Noted: 2020-11-15

## 2020-11-15 PROBLEM — O16.3 ELEVATED BLOOD PRESSURE AFFECTING PREGNANCY IN THIRD TRIMESTER, ANTEPARTUM: Status: ACTIVE | Noted: 2020-11-15

## 2020-11-15 PROBLEM — O42.90 AMNIOTIC FLUID LEAKING: Status: ACTIVE | Noted: 2020-11-15

## 2020-11-15 RX ORDER — BUPIVACAINE HCL/0.9 % NACL/PF 0.25 %
5 PLASTIC BAG, INJECTION (ML) EPIDURAL AS NEEDED
Status: DISCONTINUED | OUTPATIENT
Start: 2020-11-15 | End: 2020-11-16

## 2020-11-15 RX ORDER — IBUPROFEN 600 MG/1
600 TABLET ORAL EVERY 6 HOURS PRN
Status: DISCONTINUED | OUTPATIENT
Start: 2020-11-15 | End: 2020-11-16

## 2020-11-15 RX ORDER — ACETAMINOPHEN 500 MG
500 TABLET ORAL EVERY 6 HOURS PRN
Status: DISCONTINUED | OUTPATIENT
Start: 2020-11-15 | End: 2020-11-16

## 2020-11-15 RX ORDER — BUPIVACAINE HCL/0.9 % NACL/PF 0.25 %
10 PLASTIC BAG, INJECTION (ML) EPIDURAL ONCE
Status: DISCONTINUED | OUTPATIENT
Start: 2020-11-15 | End: 2020-11-16

## 2020-11-15 RX ORDER — DIPHENHYDRAMINE HYDROCHLORIDE 50 MG/ML
12.5 INJECTION INTRAMUSCULAR; INTRAVENOUS EVERY 4 HOURS PRN
Status: DISCONTINUED | OUTPATIENT
Start: 2020-11-15 | End: 2020-11-16

## 2020-11-15 RX ORDER — SODIUM CHLORIDE, SODIUM LACTATE, POTASSIUM CHLORIDE, CALCIUM CHLORIDE 600; 310; 30; 20 MG/100ML; MG/100ML; MG/100ML; MG/100ML
INJECTION, SOLUTION INTRAVENOUS CONTINUOUS
Status: DISCONTINUED | OUTPATIENT
Start: 2020-11-15 | End: 2020-11-16 | Stop reason: HOSPADM

## 2020-11-15 RX ORDER — BUPIVACAINE HYDROCHLORIDE 2.5 MG/ML
INJECTION, SOLUTION EPIDURAL; INFILTRATION; INTRACAUDAL AS NEEDED
Status: DISCONTINUED | OUTPATIENT
Start: 2020-11-15 | End: 2020-11-16 | Stop reason: SURG

## 2020-11-15 RX ORDER — ONDANSETRON 2 MG/ML
4 INJECTION INTRAMUSCULAR; INTRAVENOUS EVERY 6 HOURS PRN
Status: DISCONTINUED | OUTPATIENT
Start: 2020-11-15 | End: 2020-11-16

## 2020-11-15 RX ORDER — DEXTROSE, SODIUM CHLORIDE, SODIUM LACTATE, POTASSIUM CHLORIDE, AND CALCIUM CHLORIDE 5; .6; .31; .03; .02 G/100ML; G/100ML; G/100ML; G/100ML; G/100ML
INJECTION, SOLUTION INTRAVENOUS AS NEEDED
Status: DISCONTINUED | OUTPATIENT
Start: 2020-11-15 | End: 2020-11-16

## 2020-11-15 RX ORDER — TRISODIUM CITRATE DIHYDRATE AND CITRIC ACID MONOHYDRATE 500; 334 MG/5ML; MG/5ML
30 SOLUTION ORAL AS NEEDED
Status: DISCONTINUED | OUTPATIENT
Start: 2020-11-15 | End: 2020-11-16

## 2020-11-15 RX ORDER — TERBUTALINE SULFATE 1 MG/ML
0.25 INJECTION, SOLUTION SUBCUTANEOUS AS NEEDED
Status: DISCONTINUED | OUTPATIENT
Start: 2020-11-15 | End: 2020-11-16

## 2020-11-15 RX ORDER — AMMONIA INHALANTS 0.04 G/.3ML
0.3 INHALANT RESPIRATORY (INHALATION) AS NEEDED
Status: DISCONTINUED | OUTPATIENT
Start: 2020-11-15 | End: 2020-11-16

## 2020-11-15 RX ADMIN — BUPIVACAINE HYDROCHLORIDE 5 ML: 2.5 INJECTION, SOLUTION EPIDURAL; INFILTRATION; INTRACAUDAL at 21:13:00

## 2020-11-16 PROBLEM — Z87.59 STATUS POST VACUUM-ASSISTED VAGINAL DELIVERY: Status: ACTIVE | Noted: 2020-11-16

## 2020-11-16 PROBLEM — O45.90 PLACENTAL ABRUPTION AFFECTING DELIVERY (HCC): Status: ACTIVE | Noted: 2020-11-16

## 2020-11-16 PROBLEM — O36.5990: Status: ACTIVE | Noted: 2020-11-16

## 2020-11-16 PROBLEM — O42.90 AMNIOTIC FLUID LEAKING (HCC): Status: RESOLVED | Noted: 2020-11-15 | Resolved: 2020-11-16

## 2020-11-16 PROBLEM — O42.90 AMNIOTIC FLUID LEAKING: Status: RESOLVED | Noted: 2020-11-15 | Resolved: 2020-11-16

## 2020-11-16 PROBLEM — O45.90 PLACENTAL ABRUPTION AFFECTING DELIVERY: Status: ACTIVE | Noted: 2020-11-16

## 2020-11-16 PROCEDURE — 10H073Z INSERTION OF MONITORING ELECTRODE INTO PRODUCTS OF CONCEPTION, VIA NATURAL OR ARTIFICIAL OPENING: ICD-10-PCS | Performed by: OBSTETRICS & GYNECOLOGY

## 2020-11-16 PROCEDURE — 0KQM0ZZ REPAIR PERINEUM MUSCLE, OPEN APPROACH: ICD-10-PCS | Performed by: OBSTETRICS & GYNECOLOGY

## 2020-11-16 PROCEDURE — 4A0H74Z MEASUREMENT OF PRODUCTS OF CONCEPTION, CARDIAC ELECTRICAL ACTIVITY, VIA NATURAL OR ARTIFICIAL OPENING: ICD-10-PCS | Performed by: OBSTETRICS & GYNECOLOGY

## 2020-11-16 PROCEDURE — 59409 OBSTETRICAL CARE: CPT | Performed by: OBSTETRICS & GYNECOLOGY

## 2020-11-16 RX ORDER — SIMETHICONE 80 MG
80 TABLET,CHEWABLE ORAL 3 TIMES DAILY PRN
Status: DISCONTINUED | OUTPATIENT
Start: 2020-11-16 | End: 2020-11-18

## 2020-11-16 RX ORDER — LABETALOL HYDROCHLORIDE 5 MG/ML
INJECTION, SOLUTION INTRAVENOUS
Status: COMPLETED
Start: 2020-11-16 | End: 2020-11-16

## 2020-11-16 RX ORDER — IBUPROFEN 600 MG/1
600 TABLET ORAL EVERY 6 HOURS
Status: DISCONTINUED | OUTPATIENT
Start: 2020-11-16 | End: 2020-11-18

## 2020-11-16 RX ORDER — BISACODYL 10 MG
10 SUPPOSITORY, RECTAL RECTAL ONCE AS NEEDED
Status: DISCONTINUED | OUTPATIENT
Start: 2020-11-16 | End: 2020-11-18

## 2020-11-16 RX ORDER — LABETALOL HYDROCHLORIDE 5 MG/ML
10 INJECTION, SOLUTION INTRAVENOUS ONCE
Status: DISCONTINUED | OUTPATIENT
Start: 2020-11-16 | End: 2020-11-16

## 2020-11-16 RX ORDER — LABETALOL HYDROCHLORIDE 5 MG/ML
20 INJECTION, SOLUTION INTRAVENOUS ONCE
Status: COMPLETED | OUTPATIENT
Start: 2020-11-16 | End: 2020-11-16

## 2020-11-16 RX ORDER — ACETAMINOPHEN 325 MG/1
650 TABLET ORAL EVERY 6 HOURS PRN
Status: DISCONTINUED | OUTPATIENT
Start: 2020-11-16 | End: 2020-11-18

## 2020-11-16 RX ORDER — DOCUSATE SODIUM 100 MG/1
100 CAPSULE, LIQUID FILLED ORAL
Status: DISCONTINUED | OUTPATIENT
Start: 2020-11-16 | End: 2020-11-18

## 2020-11-16 NOTE — PROGRESS NOTES
Feeling some rectal pressure. Test push with RN prior to me entering room resulted in a prolonged deceleration to 90s x approx 4 min. Cervix complete/0 station at 0130. Fetal scalp electrode placed.      Patient repositioned with recovery of heart t

## 2020-11-16 NOTE — PROGRESS NOTES
Received in mother/baby in stable condition in room 2208. Id bands verified. Hugs and kisses verified. Oriented to room. Call light in reach. Side rails up x 2. Bed in low position.

## 2020-11-16 NOTE — PLAN OF CARE
Problem: SAFETY ADULT - FALL  Goal: Free from fall injury  Description: INTERVENTIONS:  - Assess pt frequently for physical needs  - Identify cognitive and physical deficits and behaviors that affect risk of falls.   - [place] fall precautions as indica

## 2020-11-16 NOTE — PROGRESS NOTES
Pt reports she needs to void now, brp with assist, pt voided while getting to bathroom et voided 750 additional ml without difficulty. pericare done. Pt back to bed.

## 2020-11-16 NOTE — PLAN OF CARE
Problem: SAFETY ADULT - FALL  Goal: Free from fall injury  Description: INTERVENTIONS:  - Assess pt frequently for physical needs  - Identify cognitive and physical deficits and behaviors that affect risk of falls.   - [place] fall precautions as indica Provide emotional support with 1:1 interaction with staff  Outcome: Progressing

## 2020-11-16 NOTE — ANESTHESIA PROCEDURE NOTES
Labor Analgesia  Performed by: Louis Waters MD  Authorized by: Louis Waters MD       General Information and Staff    Start Time:  11/15/2020 9:10 PM  End Time:  11/15/2020 9:23 PM  Anesthesiologist:  Louis Waters MD  Performed by:   Anesthesiologist  Radha

## 2020-11-16 NOTE — H&P
[address] [place] [address] Patient Status:  Inpatient    1995 MRN [id number]   Location [address] [facility] Attending Nira Spatz, MD   Hosp Day # 0 PCP Rohan Cruz MD Problems Father    • Other (GERD) Mother    • Hypertension Maternal Grandfather    • Diabetes Maternal Grandfather    • Cancer Maternal Grandfather         KIDNEY CANCER, REMOVED ONE KIDNEY   • Other (GERD) Maternal Grandfather      Social History: Social Hematocrit      35.0 - 48.0 % 35.1   Platelet Count      427.3 - 450.0 10(3)uL 195.0   MCV      80.0 - 100.0 fL 89.5   MCH      26.0 - 34.0 pg 30.6   MCHC      31.0 - 37.0 g/dL 34.2   RDW      11.0 - 15.0 % 13.0   RDW-SD      35.1 - 46.3 fL 42.2   Prelim has been asked to consider lowering epidural rate. Mildly elevated BP  -is in moderate pain from contractions  -no symptoms of preeclampsia  -labs with normal platelets, creatinine, LFTs. Uric acid ok at 4.1.  Urine P/C ratio 0.26    SROM in labor.  -ha

## 2020-11-16 NOTE — PLAN OF CARE
Problem: SAFETY ADULT - FALL  Goal: Free from fall injury  Description: INTERVENTIONS:  - Assess pt frequently for physical needs  - Identify cognitive and physical deficits and behaviors that affect risk of falls.   - [place] fall precautions as indica appropriate  11/16/2020 0304 by Drea Orellana, RN  Outcome: Completed  11/15/2020 2144 by Drea Orellana, RN  Outcome: Progressing     Problem: ANXIETY  Goal: Will report anxiety at manageable levels  Description: INTERVENTIONS:  - Administer medi

## 2020-11-16 NOTE — CM/SW NOTE
met with patient, Vini Kirkpatrick, to review insurance and PCP for infant. Patient would like infant added to medicaid. [facility] called and asked to follow up with patient to do add on for infant.  Patient plans to breast feed and already got helga

## 2020-11-16 NOTE — PROGRESS NOTES
FHR not tracing,readjustd, 's. covid test done, pt transferred ambulatory to room 114. EFM applied, POC reviewed, call light within reach.

## 2020-11-16 NOTE — PROGRESS NOTES
Report called to VIKTORIYA Braxton in [place]. Patient transferred in stable condition via wheelchair to room 2208.

## 2020-11-16 NOTE — ANESTHESIA PREPROCEDURE EVALUATION
PRE-OP EVALUATION    Patient Name: Shiv Lizarragayashiraing-Skye    Pre-op Diagnosis: * IUP in labor    Continuous labor epidural analgesia. *    * Surgery not found *    Pre-op vitals reviewed.   Pulse: 80  Resp: 18  BP: 144/99     There is no height o complications         GI/Hepatic/Renal                                 Cardiovascular    Negative cardiovascular ROS.     Exercise tolerance: good     MET: >4                                           Endo/Other    Negative endo/other ROS.           (+) ane Continuous labor epidural analgesia is planned. Patient and family are aware of risks post dural puncture headache, unilateral block, and potential failure of pain relief. Implied distant risks include spinal hematoma and infection.   Understanding of ri

## 2020-11-16 NOTE — L&D DELIVERY NOTE
Conchis, Girl [[id number]]    Labor Events     labor?: No   steroids?: None  Antibiotics received during labor?: Yes  Antibiotics (enter # doses in comment): ampicillin  Rupture date/time: 11/15/2020 1800     Rupture type: SROM  Fl Vessels: 3 Vessels  Complications: None  Timed cord clamping: Yes  Time in sec: 30  Cord blood disposition: to lab  Gases sent?: Yes     Resuscitation    Method: Suctioning, None     Pfeifer Measurements    Weight: 2770 g 6 lb 1.7 oz Length: 48.3 cm   He oxytocin. For a short time she was noted to be jennifer every 1-2 minutes. This was followed by a more normal appearing contraction pattern.  Fetal heart tones continued to dip with pushing efforts with slow return to about 100-110s, occasionally up

## 2020-11-16 NOTE — PROGRESS NOTES
Pt is a 31year old female admitted to TRG3/TRG3-A. Patient presents with:  R/o Rom: had a gush at 1800, and leaking     Pt is  39w2d intra-uterine pregnancy. History obtained. Oriented to room, staff, and plan of care.

## 2020-11-16 NOTE — PAYOR COMM NOTE
--------------  ADMISSION REVIEW     Payor: Heriberto Mckeon #:  [id number]  Authorization Number: [id number]    Admit date: 11/15/20  Admit time: 1849       Admitting Physician: Paola Green MD  Attending Camilo Dominguez SAB TAB Ectopic Multiple Live Births                  # Outcome Date GA Lbr Madhav/2nd Weight Sex Delivery Anes PTL Lv   1 Current              Past Medical History:   Past Medical History:   Diagnosis Date   • Anemia     Was on Nexplanon for 1 year around ag Resp:    16   Temp:    98.2 °F (36.8 °C)   TempSrc:    Temporal   SpO2:          Estimated body mass index is 41.4 kg/m² as calculated from the following:    Height as of 11/11/20: 60\". Weight as of 11/11/20: 212 lb (96.2 kg).      bpm, moderate BUN/CREAT Ratio      10.0 - 20.0 14.1   CALCIUM      8.5 - 10.1 mg/dL 8.9   CALCULATED OSMOLALITY      275 - 295 mOsm/kg 282   eGFR NON-AFR.  AMERICAN      >=60 124   eGFR       >=60 142   AST (SGOT)      15 - 37 U/L 9 (L)   ALT (SGPT) Indications for augmentation: Ineffective Contraction Pattern  Intrapartum & labor complications: Variable decelerations, Late decelerations, Fetal bradycardia, Abruptio Placenta  Intrapartum & labor complications comment: Amniotomy of forebag.  Late and va Weight: 2770 g 6 lb 1.7 oz Length: 48.3 cm    Head circum.: 32.5 cm Chest circum.: 28.5 cm       Abdominal circum.: 27.5 cm         Placenta    Date/time: 11/16/2020 0251  Removal: Expressed  Appearance: Intact  Disposition: Pathology      Apgars    Living For a short time she was noted to be jennifer every 1-2 minutes. This was followed by a more normal appearing contraction pattern. Fetal heart tones continued to dip with pushing efforts with slow return to about 100-110s, occasionally up to 120s-130s. Ampicillin Sodium (OMNIPEN) 1 g in sodium chloride 0.9% 100 mL IVPB-minibag/add-vantage     Date Action Dose Route User    11/15/2020 5093 New Bag 1 g Intravenous Lizzeth DICKINSON RN      Ampicillin Sodium (OMNIPEN) 2 g in sodium chloride 0.9% 100 mL IV Date Action Dose Route User    11/15/2020 2059 New Bag (none) Intravenous Maida DICKINSON RN      oxyTOCIN (PITOCIN) 30 units/ 500 ml 0.9% NS premix infusion     Date Action Dose Route User    11/16/2020 0251 New Bag 300 mL/hr Intravenous Miley Maxwell

## 2020-11-17 NOTE — PROGRESS NOTES
[facility]  Post-Partum Progress Note    Beto Balderrama Patient Status:  Inpatient    1995 MRN [id number]   [facility] 2SW-J Attending Loraine Roberts MD   Hosp Day # 2 PCP Lizeth Edwards MD     [facility]

## 2020-11-17 NOTE — PAYOR COMM NOTE
--------------  CONTINUED STAY REVIEW------REQUESTING ADDITIONAL DAY 11/17      Payor: Heriberto Mckeon #:  [id number]  Authorization Number: [id number]    Admit date: 11/15/20  Admit time: 1849    Admitting Physician: docusate sodium (COLACE) cap 100 mg     Date Action Dose Route User    11/17/2020 0555 Given 100 mg Oral Keyla VIKTORIYA Calixto    11/16/2020 1716 Given 100 mg Oral Beckya VIKTORIYA Ames      ibuprofen (MOTRIN) tab 600 mg     Date Action Dose Route User    11/17/2020 1

## 2020-11-17 NOTE — PROGRESS NOTES
Labor Analgesia Follow Up Note    Patient underwent epidural anesthesia for labor analgesia,    Placenta Date/Time: 11/16/2020  2:51 AM    Delivery Date/Time[de-identified] 11/16/2020  2:47 AM    /88 (BP Location: Right arm)   Pulse 97   Temp 98 °F (36.7 °C) (Ora

## 2020-11-18 VITALS
DIASTOLIC BLOOD PRESSURE: 87 MMHG | OXYGEN SATURATION: 100 % | RESPIRATION RATE: 18 BRPM | TEMPERATURE: 98 F | HEART RATE: 75 BPM | SYSTOLIC BLOOD PRESSURE: 130 MMHG

## 2020-11-18 RX ORDER — PSEUDOEPHEDRINE HCL 30 MG
100 TABLET ORAL 2 TIMES DAILY
Qty: 60 CAPSULE | Refills: 0 | Status: SHIPPED | OUTPATIENT
Start: 2020-11-18 | End: 2020-12-28

## 2020-11-18 RX ORDER — NIFEDIPINE 30 MG/1
30 TABLET, EXTENDED RELEASE ORAL DAILY
Status: DISCONTINUED | OUTPATIENT
Start: 2020-11-18 | End: 2020-11-18

## 2020-11-18 RX ORDER — IBUPROFEN 600 MG/1
600 TABLET ORAL EVERY 6 HOURS
Qty: 30 TABLET | Refills: 0 | Status: SHIPPED | OUTPATIENT
Start: 2020-11-18 | End: 2020-11-24

## 2020-11-18 RX ORDER — NIFEDIPINE 30 MG/1
30 TABLET, FILM COATED, EXTENDED RELEASE ORAL DAILY
Qty: 30 TABLET | Refills: 1 | Status: SHIPPED | OUTPATIENT
Start: 2020-11-19 | End: 2020-12-28

## 2020-11-18 NOTE — PROGRESS NOTES
OB progress note    31year old  female PPD#2 s/p outlet VAVD on 2020 at 39w3d for fetal bradycardia, suspected abruption during labor. Infant SGA. Afebrile  BP elevated on admission. Asymptomatic. Urine P/C ratio borderline.  Plt, LFTs, c

## 2020-11-18 NOTE — DISCHARGE SUMMARY
[facility]    Discharge Summary    Roxy Balderrama Patient Status:  Inpatient    1995 MRN [id number]   [facility] 2SW-J Attending Marci Moon MD   Hosp Day # 3 PCP Nereyda Fields MD     Date of Ad NO    Discharge medications:  Current Discharge Medication List    New Orders    docusate sodium 100 MG Oral Cap  Take 100 mg by mouth 2 (two) times daily. ibuprofen 600 MG Oral Tab  Take 1 tablet (600 mg total) by mouth every 6 (six) hours.     Selene Farley

## 2020-11-18 NOTE — PAYOR COMM NOTE
--------------  REQUESTING APPROVAL FOR ALL DAYS UP TO ( but not including)  11/18.     Pt delivered on 11/16    Thank you      11/18 CONTINUED STAY REVIEW    Payor: [address] [place] [address]  Subscriber #:  [id number]  Authorization Number:

## 2020-11-21 ENCOUNTER — TELEPHONE (OUTPATIENT)
Dept: OBGYN UNIT | Facility: HOSPITAL | Age: 25
End: 2020-11-21

## 2020-11-21 NOTE — PROGRESS NOTES
Reviewed self and infant care w / mom, she verbalizes understanding of instructions reviewed. Encourage to follow up w/ MDs as directed and w/ questions/concerns.  Reviewed sx of PIH, remains on one med and has appt on Tuesday for recheck, otw no questions

## 2020-11-24 ENCOUNTER — NURSE ONLY (OUTPATIENT)
Dept: OBGYN CLINIC | Facility: CLINIC | Age: 25
End: 2020-11-24
Payer: MEDICAID

## 2020-11-24 VITALS
WEIGHT: 196 LBS | DIASTOLIC BLOOD PRESSURE: 64 MMHG | HEIGHT: 60 IN | BODY MASS INDEX: 38.48 KG/M2 | SYSTOLIC BLOOD PRESSURE: 122 MMHG

## 2020-12-28 ENCOUNTER — POSTPARTUM (OUTPATIENT)
Dept: OBGYN CLINIC | Facility: CLINIC | Age: 25
End: 2020-12-28
Payer: MEDICAID

## 2020-12-28 VITALS
SYSTOLIC BLOOD PRESSURE: 96 MMHG | WEIGHT: 195 LBS | HEIGHT: 60 IN | DIASTOLIC BLOOD PRESSURE: 52 MMHG | BODY MASS INDEX: 38.28 KG/M2 | HEART RATE: 72 BPM

## 2020-12-28 DIAGNOSIS — Z71.85 HPV VACCINE COUNSELING: ICD-10-CM

## 2020-12-28 DIAGNOSIS — E66.9 OBESITY (BMI 30-39.9): ICD-10-CM

## 2020-12-28 DIAGNOSIS — O36.5990 SMALL FOR GESTATIONAL AGE FETUS AFFECTING MOTHER, DELIVERED: ICD-10-CM

## 2020-12-28 DIAGNOSIS — Z30.09 GENERAL COUNSELING AND ADVICE FOR CONTRACEPTIVE MANAGEMENT: ICD-10-CM

## 2020-12-28 PROBLEM — O45.90 PLACENTAL ABRUPTION AFFECTING DELIVERY (HCC): Status: RESOLVED | Noted: 2020-11-16 | Resolved: 2020-12-28

## 2020-12-28 PROBLEM — D50.9 IRON DEFICIENCY ANEMIA DURING PREGNANCY (HCC): Status: RESOLVED | Noted: 2020-05-12 | Resolved: 2020-12-28

## 2020-12-28 PROBLEM — O16.3 ELEVATED BLOOD PRESSURE AFFECTING PREGNANCY IN THIRD TRIMESTER, ANTEPARTUM (HCC): Status: RESOLVED | Noted: 2020-11-15 | Resolved: 2020-12-28

## 2020-12-28 PROBLEM — D50.9 IRON DEFICIENCY ANEMIA DURING PREGNANCY: Status: RESOLVED | Noted: 2020-05-12 | Resolved: 2020-12-28

## 2020-12-28 PROBLEM — O99.019 IRON DEFICIENCY ANEMIA DURING PREGNANCY (HCC): Status: RESOLVED | Noted: 2020-05-12 | Resolved: 2020-12-28

## 2020-12-28 PROBLEM — O26.03 EXCESSIVE WEIGHT GAIN DURING PREGNANCY IN THIRD TRIMESTER: Status: RESOLVED | Noted: 2020-09-03 | Resolved: 2020-12-28

## 2020-12-28 PROBLEM — Z87.59 STATUS POST VACUUM-ASSISTED VAGINAL DELIVERY: Status: RESOLVED | Noted: 2020-11-16 | Resolved: 2020-12-28

## 2020-12-28 PROBLEM — O26.03 EXCESSIVE WEIGHT GAIN DURING PREGNANCY IN THIRD TRIMESTER (HCC): Status: RESOLVED | Noted: 2020-09-03 | Resolved: 2020-12-28

## 2020-12-28 PROBLEM — O16.3 ELEVATED BLOOD PRESSURE AFFECTING PREGNANCY IN THIRD TRIMESTER, ANTEPARTUM: Status: RESOLVED | Noted: 2020-11-15 | Resolved: 2020-12-28

## 2020-12-28 PROBLEM — O45.90 PLACENTAL ABRUPTION AFFECTING DELIVERY: Status: RESOLVED | Noted: 2020-11-16 | Resolved: 2020-12-28

## 2020-12-28 PROBLEM — O99.019 IRON DEFICIENCY ANEMIA DURING PREGNANCY: Status: RESOLVED | Noted: 2020-05-12 | Resolved: 2020-12-28

## 2020-12-28 PROCEDURE — 3078F DIAST BP <80 MM HG: CPT | Performed by: OBSTETRICS & GYNECOLOGY

## 2020-12-28 PROCEDURE — 3008F BODY MASS INDEX DOCD: CPT | Performed by: OBSTETRICS & GYNECOLOGY

## 2020-12-28 PROCEDURE — 3074F SYST BP LT 130 MM HG: CPT | Performed by: OBSTETRICS & GYNECOLOGY

## 2020-12-28 RX ORDER — ETONOGESTREL AND ETHINYL ESTRADIOL 11.7; 2.7 MG/1; MG/1
INSERT, EXTENDED RELEASE VAGINAL
Qty: 3 RING | Refills: 3 | Status: SHIPPED | OUTPATIENT
Start: 2020-12-28

## 2020-12-28 NOTE — PROGRESS NOTES
[facility]  Obstetrics and Gynecology   Postpartum Progress Note    CC: Patient presents with:  Postpartum Care: 11/16/2020 Vag-vacuum. GIRL. Breast feeding. Stopped bleeding last week. Subjective:      Silverio Balderrama is a Placenta      Obstetric Comments   11/2020 female \"Kt\" - late & variable decels, then fetal bradycardia during pushing. Outlet vacuum delivery for tones. Slightly SGA infant.  Suspect undetected IUGR, placental insufficiency, and partial placental abr name: Not on file      Number of children: Not on file      Years of education: Not on file      Highest education level: Not on file    Tobacco Use      Smoking status: Never Smoker      Smokeless tobacco: Never Used    Substance and Sexual Activity female who presents for postpartum visit     Diagnoses and all orders for this visit:    Encounter for postpartum visit  -     Etonogestrel-Ethinyl Estradiol (NUVARING) 0.12-0.015 MG/24HR Vaginal Ring; Place 1 vaginally, leave in place 3 weeks.  Remove for

## 2020-12-28 NOTE — PATIENT INSTRUCTIONS
Gestational hypertension  -can recur in future pregnancies  -indicates increased risk for developing chronic hypertension  -recommend early ultrasound to confirm dating, low dose aspirin, Level 2 US with MFM, growth US in future pregnancies     Small for g

## 2022-05-13 PROBLEM — O12.03 EDEMA DURING PREGNANCY IN THIRD TRIMESTER (HCC): Status: RESOLVED | Noted: 2020-10-01 | Resolved: 2022-05-13

## 2022-05-13 PROBLEM — Z87.59 HISTORY OF GESTATIONAL HYPERTENSION: Status: ACTIVE | Noted: 2020-11-16

## 2022-05-13 PROBLEM — Z22.330 GBS CARRIER: Status: RESOLVED | Noted: 2020-11-11 | Resolved: 2022-05-13

## 2022-05-13 PROBLEM — Z87.59 HISTORY OF PRIOR PREGNANCY WITH SGA NEWBORN: Status: ACTIVE | Noted: 2020-11-16

## 2022-05-13 PROBLEM — Z34.90 PREGNANCY (HCC): Status: RESOLVED | Noted: 2020-10-19 | Resolved: 2022-05-13

## 2022-05-13 PROBLEM — O13.9 GESTATIONAL HYPERTENSION (HCC): Status: ACTIVE | Noted: 2020-11-16

## 2022-05-13 PROBLEM — Z34.90 PREGNANCY: Status: RESOLVED | Noted: 2020-10-19 | Resolved: 2022-05-13

## 2022-05-13 PROBLEM — O13.9 GESTATIONAL HYPERTENSION: Status: ACTIVE | Noted: 2020-11-16

## 2022-05-13 PROBLEM — O12.03 EDEMA DURING PREGNANCY IN THIRD TRIMESTER: Status: RESOLVED | Noted: 2020-10-01 | Resolved: 2022-05-13

## 2022-05-14 ENCOUNTER — OFFICE VISIT (OUTPATIENT)
Dept: OBGYN CLINIC | Facility: CLINIC | Age: 27
End: 2022-05-14
Payer: MEDICAID

## 2022-05-14 VITALS
SYSTOLIC BLOOD PRESSURE: 104 MMHG | WEIGHT: 186 LBS | HEART RATE: 103 BPM | DIASTOLIC BLOOD PRESSURE: 48 MMHG | BODY MASS INDEX: 36.52 KG/M2 | HEIGHT: 60 IN

## 2022-05-14 DIAGNOSIS — Z01.411 ENCOUNTER FOR WELL WOMAN EXAM WITH ABNORMAL FINDINGS: Primary | ICD-10-CM

## 2022-05-14 DIAGNOSIS — Z11.3 SCREENING EXAMINATION FOR STD (SEXUALLY TRANSMITTED DISEASE): ICD-10-CM

## 2022-05-14 DIAGNOSIS — Z30.09 GENERAL COUNSELING AND ADVICE FOR CONTRACEPTIVE MANAGEMENT: ICD-10-CM

## 2022-05-14 DIAGNOSIS — M79.622 AXILLARY PAIN, LEFT: ICD-10-CM

## 2022-05-14 DIAGNOSIS — Z71.85 HPV VACCINE COUNSELING: ICD-10-CM

## 2022-05-14 DIAGNOSIS — N64.4 BREAST PAIN, LEFT: ICD-10-CM

## 2022-05-14 DIAGNOSIS — E66.9 OBESITY (BMI 30-39.9): ICD-10-CM

## 2022-05-14 PROCEDURE — 87510 GARDNER VAG DNA DIR PROBE: CPT | Performed by: OBSTETRICS & GYNECOLOGY

## 2022-05-14 PROCEDURE — 87591 N.GONORRHOEAE DNA AMP PROB: CPT | Performed by: OBSTETRICS & GYNECOLOGY

## 2022-05-14 PROCEDURE — 87480 CANDIDA DNA DIR PROBE: CPT | Performed by: OBSTETRICS & GYNECOLOGY

## 2022-05-14 PROCEDURE — 87660 TRICHOMONAS VAGIN DIR PROBE: CPT | Performed by: OBSTETRICS & GYNECOLOGY

## 2022-05-14 PROCEDURE — 87491 CHLMYD TRACH DNA AMP PROBE: CPT | Performed by: OBSTETRICS & GYNECOLOGY

## 2022-05-16 LAB
C TRACH DNA SPEC QL NAA+PROBE: NEGATIVE
N GONORRHOEA DNA SPEC QL NAA+PROBE: NEGATIVE

## 2022-06-16 ENCOUNTER — OFFICE VISIT (OUTPATIENT)
Dept: FAMILY MEDICINE CLINIC | Facility: CLINIC | Age: 27
End: 2022-06-16
Payer: MEDICAID

## 2022-06-16 VITALS
RESPIRATION RATE: 20 BRPM | OXYGEN SATURATION: 100 % | DIASTOLIC BLOOD PRESSURE: 68 MMHG | WEIGHT: 180 LBS | SYSTOLIC BLOOD PRESSURE: 104 MMHG | HEIGHT: 60 IN | HEART RATE: 95 BPM | BODY MASS INDEX: 35.34 KG/M2 | TEMPERATURE: 98 F

## 2022-06-16 DIAGNOSIS — J01.40 ACUTE NON-RECURRENT PANSINUSITIS: Primary | ICD-10-CM

## 2022-06-16 DIAGNOSIS — R05.9 COUGH: ICD-10-CM

## 2022-06-16 PROCEDURE — 99213 OFFICE O/P EST LOW 20 MIN: CPT | Performed by: NURSE PRACTITIONER

## 2022-06-16 PROCEDURE — 3008F BODY MASS INDEX DOCD: CPT | Performed by: NURSE PRACTITIONER

## 2022-06-16 PROCEDURE — 3078F DIAST BP <80 MM HG: CPT | Performed by: NURSE PRACTITIONER

## 2022-06-16 PROCEDURE — 3074F SYST BP LT 130 MM HG: CPT | Performed by: NURSE PRACTITIONER

## 2022-06-16 RX ORDER — BENZONATATE 200 MG/1
200 CAPSULE ORAL 3 TIMES DAILY PRN
Qty: 30 CAPSULE | Refills: 0 | Status: SHIPPED | OUTPATIENT
Start: 2022-06-16

## 2022-06-16 RX ORDER — AMOXICILLIN AND CLAVULANATE POTASSIUM 875; 125 MG/1; MG/1
1 TABLET, FILM COATED ORAL 2 TIMES DAILY
Qty: 20 TABLET | Refills: 0 | Status: SHIPPED | OUTPATIENT
Start: 2022-06-16 | End: 2022-06-26

## 2023-02-16 NOTE — TELEPHONE ENCOUNTER
Order for Breast pump was faxed to [facility]   Fax [phone] Pt seen in tandem with Solange David CNP.  Agree with assessment and plan

## 2023-07-20 ENCOUNTER — WALK IN (OUTPATIENT)
Dept: URGENT CARE | Age: 28
End: 2023-07-20
Attending: EMERGENCY MEDICINE

## 2023-07-20 VITALS
RESPIRATION RATE: 16 BRPM | SYSTOLIC BLOOD PRESSURE: 133 MMHG | DIASTOLIC BLOOD PRESSURE: 79 MMHG | OXYGEN SATURATION: 98 % | TEMPERATURE: 97.9 F | HEART RATE: 97 BPM

## 2023-07-20 DIAGNOSIS — L08.9 SOFT TISSUE INFECTION: Primary | ICD-10-CM

## 2023-07-20 DIAGNOSIS — K04.7 DENTAL INFECTION: ICD-10-CM

## 2023-07-20 PROCEDURE — 99202 OFFICE O/P NEW SF 15 MIN: CPT

## 2023-07-20 RX ORDER — IBUPROFEN 600 MG/1
600 TABLET ORAL 3 TIMES DAILY
Qty: 30 TABLET | Refills: 0 | Status: SHIPPED | OUTPATIENT
Start: 2023-07-20 | End: 2023-07-30

## 2023-07-20 RX ORDER — AMOXICILLIN AND CLAVULANATE POTASSIUM 875; 125 MG/1; MG/1
1 TABLET, FILM COATED ORAL 2 TIMES DAILY
Qty: 20 TABLET | Refills: 0 | Status: SHIPPED | OUTPATIENT
Start: 2023-07-20 | End: 2023-07-30

## 2023-07-20 ASSESSMENT — PAIN SCALES - GENERAL
PAINLEVEL_OUTOF10: 8
PAINLEVEL: 8
PAINLEVEL_OUTOF10: 8

## 2024-01-19 ENCOUNTER — NURSE ONLY (OUTPATIENT)
Dept: INTERNAL MEDICINE CLINIC | Facility: HOSPITAL | Age: 29
End: 2024-01-19
Attending: EMERGENCY MEDICINE

## 2024-01-19 DIAGNOSIS — Z00.00 WELLNESS EXAMINATION: Primary | ICD-10-CM

## 2024-01-19 PROCEDURE — 86480 TB TEST CELL IMMUN MEASURE: CPT

## 2024-01-22 LAB
M TB IFN-G CD4+ T-CELLS BLD-ACNC: 0 IU/ML
M TB TUBERC IFN-G BLD QL: POSITIVE
M TB TUBERC IGNF/MITOGEN IGNF CONTROL: >10 IU/ML
QFT TB1 AG MINUS NIL: 1 IU/ML
QFT TB2 AG MINUS NIL: 0.77 IU/ML

## 2024-01-24 ENCOUNTER — HOSPITAL ENCOUNTER (OUTPATIENT)
Dept: GENERAL RADIOLOGY | Facility: HOSPITAL | Age: 29
Discharge: HOME | End: 2024-01-24
Attending: EMERGENCY MEDICINE

## 2024-01-24 ENCOUNTER — APPOINTMENT (OUTPATIENT)
Dept: INTERNAL MEDICINE CLINIC | Facility: HOSPITAL | Age: 29
End: 2024-01-24
Attending: EMERGENCY MEDICINE

## 2024-01-24 DIAGNOSIS — R76.12 POSITIVE QUANTIFERON-TB GOLD TEST: ICD-10-CM

## 2024-01-24 PROCEDURE — 71046 X-RAY EXAM CHEST 2 VIEWS: CPT | Performed by: EMERGENCY MEDICINE

## 2024-03-13 ENCOUNTER — WALK IN (OUTPATIENT)
Dept: URGENT CARE | Age: 29
End: 2024-03-13
Attending: EMERGENCY MEDICINE

## 2024-03-13 VITALS
OXYGEN SATURATION: 96 % | DIASTOLIC BLOOD PRESSURE: 83 MMHG | RESPIRATION RATE: 16 BRPM | HEART RATE: 98 BPM | TEMPERATURE: 97.5 F | SYSTOLIC BLOOD PRESSURE: 135 MMHG

## 2024-03-13 DIAGNOSIS — J06.9 ACUTE UPPER RESPIRATORY INFECTION: Primary | ICD-10-CM

## 2024-03-13 LAB
FLUAV RNA RESP QL NAA+PROBE: NOT DETECTED
FLUBV RNA RESP QL NAA+PROBE: NOT DETECTED
RSV AG NPH QL IA.RAPID: NOT DETECTED
SARS-COV-2 RNA RESP QL NAA+PROBE: NOT DETECTED

## 2024-03-13 PROCEDURE — 0241U POCT COVID/FLU/RSV PANEL: CPT | Performed by: EMERGENCY MEDICINE

## 2024-03-13 RX ORDER — DM/P-EPHED/ACETAMINOPH/DOXYLAM 20-10-650
POWDER IN PACKET (EA) ORAL
COMMUNITY

## 2024-03-13 ASSESSMENT — ENCOUNTER SYMPTOMS
COLOR CHANGE: 0
VOMITING: 0
POLYPHAGIA: 0
SORE THROAT: 0
POLYDIPSIA: 0
BACK PAIN: 0
CONFUSION: 0
ACTIVITY CHANGE: 0
SINUS PRESSURE: 1
COUGH: 1
DIARRHEA: 0
HEADACHES: 0
FACIAL SWELLING: 0
WOUND: 0
DIZZINESS: 0
WHEEZING: 0
EYE DISCHARGE: 0
CHILLS: 0
NUMBNESS: 0
BRUISES/BLEEDS EASILY: 0
ABDOMINAL PAIN: 0
EYE REDNESS: 0
EYE PAIN: 0
NAUSEA: 0
SHORTNESS OF BREATH: 0
FEVER: 0

## 2024-03-13 ASSESSMENT — PAIN SCALES - GENERAL
PAINLEVEL: 5
PAINLEVEL_OUTOF10: 5

## 2024-03-28 ENCOUNTER — APPOINTMENT (OUTPATIENT)
Dept: INTERNAL MEDICINE | Age: 29
End: 2024-03-28
Attending: EMERGENCY MEDICINE

## 2024-04-11 ENCOUNTER — APPOINTMENT (OUTPATIENT)
Dept: INTERNAL MEDICINE | Age: 29
End: 2024-04-11
Attending: EMERGENCY MEDICINE

## 2024-07-17 DIAGNOSIS — O36.80X1: Primary | ICD-10-CM

## 2024-07-23 ENCOUNTER — TELEPHONE (OUTPATIENT)
Dept: OBGYN CLINIC | Facility: CLINIC | Age: 29
End: 2024-07-23

## 2024-07-23 NOTE — TELEPHONE ENCOUNTER
Pts LMP of 6/13/2024 making her 5w5d. Pt states regular cycles of every 23 days. States +UPT. Pt informed of both male and female providers and the need to rotate PN appt with all since OB on-call will be the one that delivers her. Pt accepts rotation and wishes to proceed with establishing care. Pt instructed to start OTC PNV with DHA, FOLIC ACID AND IRON.  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

## 2024-08-08 ENCOUNTER — NURSE ONLY (OUTPATIENT)
Dept: OBGYN CLINIC | Facility: CLINIC | Age: 29
End: 2024-08-08
Payer: MEDICAID

## 2024-08-08 DIAGNOSIS — Z34.81 ENCOUNTER FOR SUPERVISION OF OTHER NORMAL PREGNANCY IN FIRST TRIMESTER (HCC): Primary | ICD-10-CM

## 2024-08-08 RX ORDER — CHOLECALCIFEROL (VITAMIN D3) 25 MCG
1 TABLET,CHEWABLE ORAL DAILY
COMMUNITY

## 2024-08-08 NOTE — PROGRESS NOTES
Pt seen for OBN appt today with no complaints dating 8w0d with monthly cycles. Normal PN labs ordered. Pt advised all labs must be completed and resulted prior to NPN appt. If labs are not completed and resulted the NPN appt will be cancelled. Pt informed again of both male and female providers and the need to rotate PN appt with all providers since OB on-call will be the one that delivers her. Assisted pt with scheduling NPN appt with MD.     Height: 5'0\"  Weight: 180 lbs  BMI:  35.2    Partner's name is Cortez Bocanegra contact #[phone];   Race: Black  Occupation: Pre-op PCT here at [employer]    MEDICAL HISTORY    Anemia Yes Hx anemia with prior pregnancy   Anesthetic complications No    Anxiety/Depression  Yes +Anxiety, patient accepts  referral   Autoimmune Disorder No    Asthma  No    Cancer No    Diabetes  No    Gyne/breast Surgery No    Heart Disease No    Hepatitis/Liver Disease  No    History of blood transfusion No    History of abnormal pap No    Hypertension  Yes GHTN with prior pregnancy   Infertility  No    Kidney Disease/Frequent UTIs  No    Medication Allergies No    Latex Allergies No    Food Allergies  No    Neurological Disorder/Epilepsy No    Operations/Hospitalizations No    TB exposure  No    Thyroid Dysfunction No    Trauma/Violence  No    Uterine Anomaly  No    Uterine Fibroids  No    Variocosities/DVTs No    Smoker No    Drug usage in prior year No    Alcohol No    Would you accept a blood transfusion?  Yes                INFECTION HISTORY    Chlamydia Yes Hx of chlamydia, treated   Pt or partner have hx of Genital Herpes No    Gonorrhea No    Hepatitis B No    HIV No    HPV No    MRSA No    Syphilis No    Tattoos Yes    Live with someone or Exposed to TB No    Rash or viral illness since LMP  No    Varicella No vaccinated   Pets Yes 1 [place]       GENETICS SCREENING    Genetic Screening    Genetic Screening/Teratology Counseling- Includes patient, baby's father, or anyone in either  family with:  Patient's age 35 years or older as of estimated date of delivery: No     Thalassemia (Italian, Greek, Mediterranean, or  background): MCV less than 80: No     Neural tube defect (Meningomyelocele, Spina bifida, or Anencephaly): No     Congenital heart defect: Yes (Comment: Pt's brother born with heart condition, heart issue at age 18, pt's maternal aunt born with hole in heart)     Down syndrome: No     Jr-Sachs (Ashkenazi Mormonism, Cajun, Salvadorean Dutch): No     Canavan disease (Ashkenazi Mormonism): No     Familial dysautonomia (Ashkenazi Mormonism): No     Sickle cell disease or trait (): Yes (Comment: patient's daughter with sickle cell trait, patient negative, but requesting retest, partner never tested. MIL with trait.)     Hemophilia or other blood disorders: No     Muscular dystrophy: No    Cystic fibrosis: No     Dailey's chorea: No     Intellectual disability and/or autism: No     Other inherited genetic or chromosomal disorder: No     Maternal metabolic disorder (eg. Type 1 diabetes, PKU): No     Patient or baby's father had child with birth defects not listed above: No     Recurrent pregnancy loss, or a stillbirth: No     Medications (including supplements, vitamins, herbs, or OTC drugs)/illicit/recreational drugs/alcohol since last menstrual period: No               MISC    Infant vaccinations  Yes    Pt. Has answered NO 5P questions and has NO  risk factors.    Pt. Given What pregnant women need to know handout.

## 2024-08-09 ENCOUNTER — LAB ENCOUNTER (OUTPATIENT)
Dept: LAB | Facility: HOSPITAL | Age: 29
End: 2024-08-09
Attending: OBSTETRICS & GYNECOLOGY
Payer: MEDICAID

## 2024-08-09 DIAGNOSIS — Z34.81 ENCOUNTER FOR SUPERVISION OF OTHER NORMAL PREGNANCY IN FIRST TRIMESTER (HCC): ICD-10-CM

## 2024-08-09 LAB
ANTIBODY SCREEN: NEGATIVE
BASOPHILS # BLD AUTO: 0.03 X10(3) UL (ref 0–0.2)
BASOPHILS NFR BLD AUTO: 0.3 %
DEPRECATED RDW RBC AUTO: 40.1 FL (ref 35.1–46.3)
EOSINOPHIL # BLD AUTO: 0.07 X10(3) UL (ref 0–0.7)
EOSINOPHIL NFR BLD AUTO: 0.6 %
ERYTHROCYTE [DISTWIDTH] IN BLOOD BY AUTOMATED COUNT: 12.3 % (ref 11–15)
HBV SURFACE AG SER-ACNC: 0.17 [IU]/L
HBV SURFACE AG SERPL QL IA: NONREACTIVE
HCG SERPL QL: POSITIVE
HCT VFR BLD AUTO: 33.7 %
HCV AB SERPL QL IA: NONREACTIVE
HGB BLD-MCNC: 11.5 G/DL
HGB S BLD QL SOLY: NEGATIVE
IMM GRANULOCYTES # BLD AUTO: 0.04 X10(3) UL (ref 0–1)
IMM GRANULOCYTES NFR BLD: 0.4 %
LYMPHOCYTES # BLD AUTO: 2.58 X10(3) UL (ref 1–4)
LYMPHOCYTES NFR BLD AUTO: 23.2 %
MCH RBC QN AUTO: 30.6 PG (ref 26–34)
MCHC RBC AUTO-ENTMCNC: 34.1 G/DL (ref 31–37)
MCV RBC AUTO: 89.6 FL
MONOCYTES # BLD AUTO: 0.81 X10(3) UL (ref 0.1–1)
MONOCYTES NFR BLD AUTO: 7.3 %
NEUTROPHILS # BLD AUTO: 7.59 X10 (3) UL (ref 1.5–7.7)
NEUTROPHILS # BLD AUTO: 7.59 X10(3) UL (ref 1.5–7.7)
NEUTROPHILS NFR BLD AUTO: 68.2 %
PLATELET # BLD AUTO: 226 10(3)UL (ref 150–450)
RBC # BLD AUTO: 3.76 X10(6)UL
RH BLOOD TYPE: POSITIVE
RUBV IGG SER QL: POSITIVE
RUBV IGG SER-ACNC: 118 IU/ML (ref 10–?)
T PALLIDUM AB SER QL IA: NONREACTIVE
WBC # BLD AUTO: 11.1 X10(3) UL (ref 4–11)

## 2024-08-09 PROCEDURE — 86762 RUBELLA ANTIBODY: CPT

## 2024-08-09 PROCEDURE — 85025 COMPLETE CBC W/AUTO DIFF WBC: CPT

## 2024-08-09 PROCEDURE — 84703 CHORIONIC GONADOTROPIN ASSAY: CPT

## 2024-08-09 PROCEDURE — 87086 URINE CULTURE/COLONY COUNT: CPT

## 2024-08-09 PROCEDURE — 36415 COLL VENOUS BLD VENIPUNCTURE: CPT

## 2024-08-09 PROCEDURE — 87340 HEPATITIS B SURFACE AG IA: CPT

## 2024-08-09 PROCEDURE — 86850 RBC ANTIBODY SCREEN: CPT

## 2024-08-09 PROCEDURE — 86901 BLOOD TYPING SEROLOGIC RH(D): CPT

## 2024-08-09 PROCEDURE — 86900 BLOOD TYPING SEROLOGIC ABO: CPT

## 2024-08-09 PROCEDURE — 85660 RBC SICKLE CELL TEST: CPT

## 2024-08-09 PROCEDURE — 86803 HEPATITIS C AB TEST: CPT

## 2024-08-09 PROCEDURE — 86787 VARICELLA-ZOSTER ANTIBODY: CPT

## 2024-08-09 PROCEDURE — 86780 TREPONEMA PALLIDUM: CPT

## 2024-08-09 PROCEDURE — 87389 HIV-1 AG W/HIV-1&-2 AB AG IA: CPT

## 2024-08-12 LAB — VZV IGG SER IA-ACNC: 270.1 (ref 165–?)

## 2024-08-22 ENCOUNTER — INITIAL PRENATAL (OUTPATIENT)
Dept: OBGYN CLINIC | Facility: CLINIC | Age: 29
End: 2024-08-22
Payer: MEDICAID

## 2024-08-22 ENCOUNTER — HOSPITAL ENCOUNTER (OUTPATIENT)
Dept: ULTRASOUND IMAGING | Facility: HOSPITAL | Age: 29
Discharge: HOME | End: 2024-08-22
Attending: OBSTETRICS & GYNECOLOGY
Payer: MEDICAID

## 2024-08-22 ENCOUNTER — TELEPHONE (OUTPATIENT)
Dept: OBGYN CLINIC | Facility: CLINIC | Age: 29
End: 2024-08-22

## 2024-08-22 VITALS
DIASTOLIC BLOOD PRESSURE: 78 MMHG | SYSTOLIC BLOOD PRESSURE: 120 MMHG | HEART RATE: 87 BPM | WEIGHT: 174.19 LBS | BODY MASS INDEX: 34 KG/M2

## 2024-08-22 DIAGNOSIS — O26.849 FETAL SIZE INCONSISTENT WITH DATES (HCC): ICD-10-CM

## 2024-08-22 DIAGNOSIS — O26.849 FETAL SIZE INCONSISTENT WITH DATES (HCC): Primary | ICD-10-CM

## 2024-08-22 DIAGNOSIS — Z34.81 ENCOUNTER FOR SUPERVISION OF OTHER NORMAL PREGNANCY IN FIRST TRIMESTER (HCC): Primary | ICD-10-CM

## 2024-08-22 PROBLEM — Z28.21 INFLUENZA VACCINATION DECLINED: Status: RESOLVED | Noted: 2020-10-01 | Resolved: 2024-08-22

## 2024-08-22 PROBLEM — E66.9 OBESITY (BMI 30-39.9): Status: RESOLVED | Noted: 2020-12-28 | Resolved: 2024-08-22

## 2024-08-22 PROBLEM — Z01.411 ENCOUNTER FOR WELL WOMAN EXAM WITH ABNORMAL FINDINGS: Status: RESOLVED | Noted: 2022-05-14 | Resolved: 2024-08-22

## 2024-08-22 LAB
APPEARANCE: CLEAR
BILIRUBIN: NEGATIVE
GLUCOSE (URINE DIPSTICK): NEGATIVE MG/DL
KETONES (URINE DIPSTICK): NEGATIVE MG/DL
LEUKOCYTES: NEGATIVE
MULTISTIX LOT#: NORMAL NUMERIC
NITRITE, URINE: NEGATIVE
OCCULT BLOOD: NEGATIVE
PH, URINE: 5 (ref 4.5–8)
PROTEIN (URINE DIPSTICK): NEGATIVE MG/DL
SPECIFIC GRAVITY: 1.02 (ref 1–1.03)
URINE-COLOR: YELLOW
UROBILINOGEN,SEMI-QN: 0.2 MG/DL (ref 0–1.9)

## 2024-08-22 PROCEDURE — 0500F INITIAL PRENATAL CARE VISIT: CPT | Performed by: OBSTETRICS & GYNECOLOGY

## 2024-08-22 PROCEDURE — 76801 OB US < 14 WKS SINGLE FETUS: CPT | Performed by: OBSTETRICS & GYNECOLOGY

## 2024-08-22 PROCEDURE — 81002 URINALYSIS NONAUTO W/O SCOPE: CPT | Performed by: OBSTETRICS & GYNECOLOGY

## 2024-08-22 NOTE — PROGRESS NOTES
Here with partner.  Pap/human papillomavirus/Gc/chlamydia/trichomonas.  Bedside ultrasound--gestational sac.  No fetal pole seen.  Will get hold and call ultrasound.

## 2024-08-22 NOTE — PROGRESS NOTES
Hold and call ultrasound scheduled for 4:30pm.  Patient advised to start drinking water now and head to [facility].  They will take her in about 20 minutes for the scan.  Dr. Malone notified.

## 2024-08-22 NOTE — TELEPHONE ENCOUNTER
Paged on call from [facility] ultrasound. Result shows IUP measuring 6w5d with ? Cardiac activity. Should be 10w0d by dates. I spoke with Sheila over the phone. She states regular menses q month and sure FDLMP as well as + home pregnancy test at time of missed menses in July. This would point to size markedly < dates.  I discussed plan for repeat ultrasound about 8-9 days from now. Can RN assist her with getting on schedule the Friday or Saturday of next week? Use code on problem list: Size dates discrepancy in first trimester. Let her know by tomorrow AM please. 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. Thanks.

## 2024-08-22 NOTE — TELEPHONE ENCOUNTER
Patient was seen today for New Prenatal.  Patient should be 10w by dates, but Dr. Conner was only able to bee a gestational sac.  Ultrasound said to send patient over now.  She should start drinking water and they will take her in about 20 minutes.  Order placed.  Message to Dr. Malone as JACIEL.

## 2024-08-23 LAB
C TRACH DNA SPEC QL NAA+PROBE: NEGATIVE
HPV E6+E7 MRNA CVX QL NAA+PROBE: NEGATIVE
N GONORRHOEA DNA SPEC QL NAA+PROBE: NEGATIVE
T VAGINALIS RRNA SPEC QL NAA+PROBE: NEGATIVE

## 2024-08-23 NOTE — TELEPHONE ENCOUNTER
Called and spoke to Amrita in ultrasound state she can add patient on 8/30 at 10 am, main diagnostics. Appointment secured.     Patient called and informed of time, location and full bladder instructions. Patient also requesting the lifting restriction note for work. Informed her it will be sent via [employer] under menu, then letters.

## 2024-08-30 ENCOUNTER — TELEPHONE (OUTPATIENT)
Dept: OBGYN CLINIC | Facility: CLINIC | Age: 29
End: 2024-08-30

## 2024-08-30 ENCOUNTER — HOSPITAL ENCOUNTER (OUTPATIENT)
Dept: ULTRASOUND IMAGING | Facility: HOSPITAL | Age: 29
Discharge: HOME | End: 2024-08-30
Attending: OBSTETRICS & GYNECOLOGY
Payer: MEDICAID

## 2024-08-30 DIAGNOSIS — O26.849 FETAL SIZE INCONSISTENT WITH DATES (HCC): ICD-10-CM

## 2024-08-30 PROBLEM — O02.1 MISSED ABORTION (HCC): Status: ACTIVE | Noted: 2024-08-30

## 2024-08-30 PROCEDURE — 76817 TRANSVAGINAL US OBSTETRIC: CPT | Performed by: OBSTETRICS & GYNECOLOGY

## 2024-08-30 PROCEDURE — 76801 OB US < 14 WKS SINGLE FETUS: CPT | Performed by: OBSTETRICS & GYNECOLOGY

## 2024-08-30 NOTE — TELEPHONE ENCOUNTER
Paged on call from [facility] ultrasound with results showing no interval growth since August 22nd and no FHT's. This confirms IUFD measuring 6w5d. I spoke with Sheila over the phone and discussed three options of expectant, vaginal Cytotec or D and C. She wishes to discuss with Cortez and will message back with decision. I could add tomorrow AM if she chooses Cytotec.

## 2024-09-05 ENCOUNTER — HOSPITAL ENCOUNTER (OUTPATIENT)
Facility: HOSPITAL | Age: 29
Setting detail: OBSERVATION
Discharge: HOME | End: 2024-09-06
Attending: EMERGENCY MEDICINE | Admitting: OBSTETRICS & GYNECOLOGY
Payer: MEDICAID

## 2024-09-05 ENCOUNTER — APPOINTMENT (OUTPATIENT)
Dept: ULTRASOUND IMAGING | Facility: HOSPITAL | Age: 29
End: 2024-09-05
Attending: EMERGENCY MEDICINE
Payer: MEDICAID

## 2024-09-05 DIAGNOSIS — D62 ACUTE BLOOD LOSS ANEMIA: ICD-10-CM

## 2024-09-05 DIAGNOSIS — O03.4 INCOMPLETE SPONTANEOUS ABORTION (HCC): Primary | ICD-10-CM

## 2024-09-05 DIAGNOSIS — O03.4 INCOMPLETE ABORTION (HCC): ICD-10-CM

## 2024-09-05 LAB
ANION GAP SERPL CALC-SCNC: 8 MMOL/L (ref 0–18)
B-HCG SERPL-ACNC: 1727.1 MIU/ML
BASOPHILS # BLD AUTO: 0.03 X10(3) UL (ref 0–0.2)
BASOPHILS NFR BLD AUTO: 0.3 %
BUN BLD-MCNC: 11 MG/DL (ref 9–23)
BUN/CREAT SERPL: 16.2 (ref 10–20)
CALCIUM BLD-MCNC: 8.7 MG/DL (ref 8.7–10.4)
CHLORIDE SERPL-SCNC: 110 MMOL/L (ref 98–112)
CO2 SERPL-SCNC: 22 MMOL/L (ref 21–32)
CREAT BLD-MCNC: 0.68 MG/DL
DEPRECATED RDW RBC AUTO: 44.1 FL (ref 35.1–46.3)
EGFRCR SERPLBLD CKD-EPI 2021: 120 ML/MIN/1.73M2 (ref 60–?)
EOSINOPHIL # BLD AUTO: 0.09 X10(3) UL (ref 0–0.7)
EOSINOPHIL NFR BLD AUTO: 0.8 %
ERYTHROCYTE [DISTWIDTH] IN BLOOD BY AUTOMATED COUNT: 13.3 % (ref 11–15)
GLUCOSE BLD-MCNC: 111 MG/DL (ref 70–99)
HCT VFR BLD AUTO: 23.2 %
HCT VFR BLD AUTO: 30.9 %
HGB BLD-MCNC: 10.6 G/DL
HGB BLD-MCNC: 8.4 G/DL
IMM GRANULOCYTES # BLD AUTO: 0.06 X10(3) UL (ref 0–1)
IMM GRANULOCYTES NFR BLD: 0.5 %
LYMPHOCYTES # BLD AUTO: 2.87 X10(3) UL (ref 1–4)
LYMPHOCYTES NFR BLD AUTO: 26.1 %
MCH RBC QN AUTO: 31.5 PG (ref 26–34)
MCHC RBC AUTO-ENTMCNC: 34.3 G/DL (ref 31–37)
MCV RBC AUTO: 92 FL
MONOCYTES # BLD AUTO: 0.84 X10(3) UL (ref 0.1–1)
MONOCYTES NFR BLD AUTO: 7.6 %
NEUTROPHILS # BLD AUTO: 7.12 X10 (3) UL (ref 1.5–7.7)
NEUTROPHILS # BLD AUTO: 7.12 X10(3) UL (ref 1.5–7.7)
NEUTROPHILS NFR BLD AUTO: 64.7 %
OSMOLALITY SERPL CALC.SUM OF ELEC: 290 MOSM/KG (ref 275–295)
PLATELET # BLD AUTO: 249 10(3)UL (ref 150–450)
POTASSIUM SERPL-SCNC: 3.8 MMOL/L (ref 3.5–5.1)
RBC # BLD AUTO: 3.36 X10(6)UL
SODIUM SERPL-SCNC: 140 MMOL/L (ref 136–145)
WBC # BLD AUTO: 11 X10(3) UL (ref 4–11)

## 2024-09-05 PROCEDURE — 76801 OB US < 14 WKS SINGLE FETUS: CPT | Performed by: EMERGENCY MEDICINE

## 2024-09-05 RX ORDER — MORPHINE SULFATE 4 MG/ML
4 INJECTION, SOLUTION INTRAMUSCULAR; INTRAVENOUS ONCE
Status: COMPLETED | OUTPATIENT
Start: 2024-09-05 | End: 2024-09-05

## 2024-09-05 NOTE — TELEPHONE ENCOUNTER
Can I add at 11 AM tomorrow. I'll be on call and will let her know if too busy and have to delay until afternoon

## 2024-09-05 NOTE — TELEPHONE ENCOUNTER
Sheila accepts 11 am tomorrow.   She is aware Dr. Malone is on call and will call if schedule change.

## 2024-09-05 NOTE — TELEPHONE ENCOUNTER
Called to Sheila to follow-up.  She states she was waiting to see if body passed pregnancy naturally, however no bleeding at this time.     She is asking to be added with Dr. Malone only for cytotec placement.     Advised Dr. Malone is very briefly in office tomorrow, do not know if possible to add. She states she would be comfortable waiting until next week, but does not wish to see another provider.    To Dr. Malone to advise.

## 2024-09-06 ENCOUNTER — ANESTHESIA EVENT (OUTPATIENT)
Dept: SURGERY | Facility: HOSPITAL | Age: 29
End: 2024-09-06
Payer: MEDICAID

## 2024-09-06 ENCOUNTER — ANESTHESIA (OUTPATIENT)
Dept: SURGERY | Facility: HOSPITAL | Age: 29
End: 2024-09-06
Payer: MEDICAID

## 2024-09-06 VITALS
DIASTOLIC BLOOD PRESSURE: 65 MMHG | SYSTOLIC BLOOD PRESSURE: 115 MMHG | HEART RATE: 66 BPM | RESPIRATION RATE: 18 BRPM | OXYGEN SATURATION: 100 % | TEMPERATURE: 98 F

## 2024-09-06 PROBLEM — O03.4 INCOMPLETE SPONTANEOUS ABORTION (HCC): Status: ACTIVE | Noted: 2024-09-06

## 2024-09-06 PROBLEM — D62 ACUTE BLOOD LOSS ANEMIA: Status: ACTIVE | Noted: 2024-09-06

## 2024-09-06 LAB
ANTIBODY SCREEN: NEGATIVE
RH BLOOD TYPE: POSITIVE

## 2024-09-06 PROCEDURE — 10D17ZZ EXTRACTION OF PRODUCTS OF CONCEPTION, RETAINED, VIA NATURAL OR ARTIFICIAL OPENING: ICD-10-PCS | Performed by: OBSTETRICS & GYNECOLOGY

## 2024-09-06 PROCEDURE — 59812 TREATMENT OF MISCARRIAGE: CPT | Performed by: OBSTETRICS & GYNECOLOGY

## 2024-09-06 RX ORDER — HYDROCODONE BITARTRATE AND ACETAMINOPHEN 5; 325 MG/1; MG/1
1 TABLET ORAL EVERY 6 HOURS PRN
Status: DISCONTINUED | OUTPATIENT
Start: 2024-09-06 | End: 2024-09-06

## 2024-09-06 RX ORDER — MORPHINE SULFATE 4 MG/ML
4 INJECTION, SOLUTION INTRAMUSCULAR; INTRAVENOUS EVERY 10 MIN PRN
Status: DISCONTINUED | OUTPATIENT
Start: 2024-09-06 | End: 2024-09-06 | Stop reason: HOSPADM

## 2024-09-06 RX ORDER — SODIUM CHLORIDE, SODIUM LACTATE, POTASSIUM CHLORIDE, CALCIUM CHLORIDE 600; 310; 30; 20 MG/100ML; MG/100ML; MG/100ML; MG/100ML
INJECTION, SOLUTION INTRAVENOUS CONTINUOUS
Status: DISCONTINUED | OUTPATIENT
Start: 2024-09-06 | End: 2024-09-06 | Stop reason: HOSPADM

## 2024-09-06 RX ORDER — ACETAMINOPHEN 325 MG/1
650 TABLET ORAL EVERY 6 HOURS PRN
Status: DISCONTINUED | OUTPATIENT
Start: 2024-09-06 | End: 2024-09-06

## 2024-09-06 RX ORDER — HYDROCODONE BITARTRATE AND ACETAMINOPHEN 5; 325 MG/1; MG/1
2 TABLET ORAL EVERY 6 HOURS PRN
Status: DISCONTINUED | OUTPATIENT
Start: 2024-09-06 | End: 2024-09-06

## 2024-09-06 RX ORDER — ACETAMINOPHEN 325 MG/1
650 TABLET ORAL EVERY 6 HOURS PRN
Qty: 30 TABLET | Refills: 0 | Status: SHIPPED | OUTPATIENT
Start: 2024-09-06

## 2024-09-06 RX ORDER — HYDROMORPHONE HYDROCHLORIDE 1 MG/ML
0.2 INJECTION, SOLUTION INTRAMUSCULAR; INTRAVENOUS; SUBCUTANEOUS EVERY 5 MIN PRN
Status: DISCONTINUED | OUTPATIENT
Start: 2024-09-06 | End: 2024-09-06 | Stop reason: HOSPADM

## 2024-09-06 RX ORDER — HYDROMORPHONE HYDROCHLORIDE 1 MG/ML
0.6 INJECTION, SOLUTION INTRAMUSCULAR; INTRAVENOUS; SUBCUTANEOUS EVERY 5 MIN PRN
Status: DISCONTINUED | OUTPATIENT
Start: 2024-09-06 | End: 2024-09-06 | Stop reason: HOSPADM

## 2024-09-06 RX ORDER — MORPHINE SULFATE 4 MG/ML
2 INJECTION, SOLUTION INTRAMUSCULAR; INTRAVENOUS EVERY 10 MIN PRN
Status: DISCONTINUED | OUTPATIENT
Start: 2024-09-06 | End: 2024-09-06 | Stop reason: HOSPADM

## 2024-09-06 RX ORDER — DEXAMETHASONE SODIUM PHOSPHATE 4 MG/ML
VIAL (ML) INJECTION AS NEEDED
Status: DISCONTINUED | OUTPATIENT
Start: 2024-09-06 | End: 2024-09-06 | Stop reason: SURG

## 2024-09-06 RX ORDER — IBUPROFEN 600 MG/1
600 TABLET, FILM COATED ORAL EVERY 6 HOURS PRN
Qty: 30 TABLET | Refills: 0 | Status: SHIPPED | OUTPATIENT
Start: 2024-09-06

## 2024-09-06 RX ORDER — ONDANSETRON 4 MG/1
4 TABLET, FILM COATED ORAL EVERY 8 HOURS PRN
Status: DISCONTINUED | OUTPATIENT
Start: 2024-09-06 | End: 2024-09-06

## 2024-09-06 RX ORDER — ONDANSETRON 2 MG/ML
INJECTION INTRAMUSCULAR; INTRAVENOUS AS NEEDED
Status: DISCONTINUED | OUTPATIENT
Start: 2024-09-06 | End: 2024-09-06 | Stop reason: SURG

## 2024-09-06 RX ORDER — HYDROMORPHONE HYDROCHLORIDE 1 MG/ML
0.4 INJECTION, SOLUTION INTRAMUSCULAR; INTRAVENOUS; SUBCUTANEOUS EVERY 5 MIN PRN
Status: DISCONTINUED | OUTPATIENT
Start: 2024-09-06 | End: 2024-09-06 | Stop reason: HOSPADM

## 2024-09-06 RX ORDER — ONDANSETRON 2 MG/ML
4 INJECTION INTRAMUSCULAR; INTRAVENOUS EVERY 8 HOURS PRN
Status: DISCONTINUED | OUTPATIENT
Start: 2024-09-06 | End: 2024-09-06

## 2024-09-06 RX ORDER — NALOXONE HYDROCHLORIDE 0.4 MG/ML
0.08 INJECTION, SOLUTION INTRAMUSCULAR; INTRAVENOUS; SUBCUTANEOUS AS NEEDED
Status: DISCONTINUED | OUTPATIENT
Start: 2024-09-06 | End: 2024-09-06 | Stop reason: HOSPADM

## 2024-09-06 RX ORDER — KETOROLAC TROMETHAMINE 30 MG/ML
INJECTION, SOLUTION INTRAMUSCULAR; INTRAVENOUS AS NEEDED
Status: DISCONTINUED | OUTPATIENT
Start: 2024-09-06 | End: 2024-09-06 | Stop reason: SURG

## 2024-09-06 RX ORDER — MORPHINE SULFATE 10 MG/ML
6 INJECTION, SOLUTION INTRAMUSCULAR; INTRAVENOUS EVERY 10 MIN PRN
Status: DISCONTINUED | OUTPATIENT
Start: 2024-09-06 | End: 2024-09-06 | Stop reason: HOSPADM

## 2024-09-06 RX ORDER — ROCURONIUM BROMIDE 10 MG/ML
INJECTION, SOLUTION INTRAVENOUS AS NEEDED
Status: DISCONTINUED | OUTPATIENT
Start: 2024-09-06 | End: 2024-09-06 | Stop reason: SURG

## 2024-09-06 RX ADMIN — KETOROLAC TROMETHAMINE 30 MG: 30 INJECTION, SOLUTION INTRAMUSCULAR; INTRAVENOUS at 01:45:00

## 2024-09-06 RX ADMIN — ONDANSETRON 4 MG: 2 INJECTION INTRAMUSCULAR; INTRAVENOUS at 01:19:00

## 2024-09-06 RX ADMIN — ROCURONIUM BROMIDE 50 MG: 10 INJECTION, SOLUTION INTRAVENOUS at 01:19:00

## 2024-09-06 RX ADMIN — DEXAMETHASONE SODIUM PHOSPHATE 4 MG: 4 MG/ML VIAL (ML) INJECTION at 01:19:00

## 2024-09-06 NOTE — OPERATIVE REPORT
[facility]  part of [facility]    Gyne Detailed Operative Note    Sheila Cheung Patient Status:  Emergency    1995 MRN [id number]   Location [facility] OPERATING ROOM Attending Angela Conner MD   Hosp Day # 0 PCP None Pcp       Date:  2024    Preoperative Diagnosis: Incomplete  (HCC) [O03.4]    Postoperative Diagnosis:  Incomplete  (HCC) [O03.4]    Procedures:    Suction D&C    Primary Surgeon:   Angela Conner MD    Anesthesia:    General    Estimated Blood Loss:  10 cc    Antibiotics:     none    Complications:   none    Surgical Findings:  Uterus 8 week size.  POC protruding from cervix    Procedure Note:    After induction of general anesthesia, the patient was placed in a low dorsal lithotomy position.  She was sterilely prepped and draped.  Bladder was emptied via straight catheter.  Pelvic exam was done.  Cervix grasped with single tooth tenaculum.      The products of conception that was protruding from the cervix was grasped with a ring forceps and removed.  Uterus sounded 9cm.  Cervix was already dilated 1-2 cm.  Several passes with the 8 mm suction curet was used obtain scant tissue.  This was done until a good \"cry\" was obtained.    Single tooth tenaculum was removed.  Hemostasis was good    The final needle, sponge and instrument counts were correct.  There were no complications.  EBL 10cc.  Specimens: POC.  The patient tolerated the procedure well and was taken to the recovery room in satisfactory condition.        Angela Conner MD  2024

## 2024-09-06 NOTE — ANESTHESIA PREPROCEDURE EVALUATION
Anesthesia PreOp Note    HPI:     Sheila Cheung is a 29 year old female who presents for preoperative consultation requested by: Angela Conner MD    Date of Surgery: 2024 - 2024    Procedure(s):  DILATION & CURETTAGE SUCTION  Indication: Incomplete  (HCC) [O03.4]    Relevant Problems   No relevant active problems       NPO:                 NPO: Yes       History Review:  Patient Active Problem List    Diagnosis Date Noted    Missed  (HCC) 2024    BMI 33.0-33.9,adult 2024    History of prior pregnancy with SGA  2020    History of gestational hypertension 2020       Past Medical History:    Anemia    Was on Nexplanon for 1 year around age 17. Had bleeding for about 1 full year & became anemic.     Chlamydia    Decorative tattoo    Gestational hypertension (HCC)    discharged home on nifedipine 30 mg XR     History of prior pregnancy with SGA     Apparent undetected IUGR as infant measured just slightly SGA at birth. Delivery complicated by fetal bradycardia. Suspected partial placental abruption.     Placental abruption affecting delivery (HCC)    Apparently occurring during 2nd stage of labor with development of fetal bradycardia.        History reviewed. No pertinent surgical history.    (Not in a hospital admission)    No current [facility]-ordered facility-administered medications on file.     Current Outpatient Medications Ordered in Epic   Medication Sig Dispense Refill    prenatal vitamin with DHA 27-0.8-228 MG Oral Cap Take 1 capsule by mouth daily.         No Known Allergies    Family History   Problem Relation Age of Onset    No Known Problems Father     Other (GERD) Mother     Other (sickle trait) Daughter     Hypertension Maternal Grandmother     Diabetes Maternal Grandmother     Hypertension Maternal Grandfather     Diabetes Maternal Grandfather     Cancer Maternal Grandfather         KIDNEY CANCER, REMOVED ONE KIDNEY    Other  (GERD) Maternal Grandfather     Hypertension Brother         in his 30s    Diabetes Brother     Other (Amputee from gunshot wound) Brother     Breast Cancer Neg     Ovarian Cancer Neg     Colon Cancer Neg     Endometriosis Neg     Infertility Neg     Uterine Cancer Neg     Pancreatic Cancer Neg      Social History     Socioeconomic History    Marital status: Single   Tobacco Use    Smoking status: Never    Smokeless tobacco: Never   Vaping Use    Vaping status: Never Used   Substance and Sexual Activity    Alcohol use: Never    Drug use: Not Currently    Sexual activity: Not Currently     Birth control/protection: Abstinence       Available pre-op labs reviewed.  Lab Results   Component Value Date    WBC 11.0 09/05/2024    RBC 3.36 (L) 09/05/2024    HGB 8.4 (L) 09/05/2024    HCT 23.2 (L) 09/05/2024    MCV 92.0 09/05/2024    MCH 31.5 09/05/2024    MCHC 34.3 09/05/2024    RDW 13.3 09/05/2024    .0 09/05/2024    PREGS Positive (A) 08/09/2024    HCGQN 1,727.1 (H) 09/05/2024     Lab Results   Component Value Date     09/05/2024    K 3.8 09/05/2024     09/05/2024    CO2 22.0 09/05/2024    BUN 11 09/05/2024    CREATSERUM 0.68 09/05/2024     (H) 09/05/2024    CA 8.7 09/05/2024          Vital Signs:  There is no height or weight on file to calculate BMI.   temporal temperature is 97.8 °F (36.6 °C). Her blood pressure is 120/72 and her pulse is 80. Her respiration is 18 and oxygen saturation is 97%.   Vitals:    09/05/24 2230 09/05/24 2300 09/05/24 2324 09/06/24 0000   BP: 122/67 137/77 100/58 120/72   Pulse: 85 90 81 80   Resp: 20 24 20 18   Temp:       TempSrc:       SpO2: 97% 100% 98% 97%        Anesthesia Evaluation     Patient summary reviewed and Nursing notes reviewed    No history of anesthetic complications   Airway   Mallampati: I  TM distance: >3 FB  Neck ROM: full  Dental      Pulmonary    Cardiovascular - normal exam  (+) hypertension    Neuro/Psych      GI/Hepatic/Renal      Endo/Other     Abdominal                  Anesthesia Plan:   ASA:  2  Emergent    Plan:   General  Informed Consent Plan and Risks Discussed With:  Patient      I have informed Sheila Cheung and/or legal guardian or family member of the nature of the anesthetic plan, benefits, risks including possible dental damage if relevant, major complications, and any alternative forms of anesthetic management.   All of the patient's questions were answered to the best of my ability. The patient desires the anesthetic management as planned.  Daljit Martinez MD  9/6/2024 12:59 AM  Present on Admission:  **None**

## 2024-09-06 NOTE — DISCHARGE SUMMARY
[facility]  part of [facility]    Outpatient Surgery Discharge Summary    Sheila Cheung Patient Status:  Emergency    1995 MRN [id number]   Location [facility] OPERATING ROOM Attending Angela Conner MD   Hosp Day # 0 PCP None Pcp     Date of Admission: 2024     Date of Discharge:      Admitting Diagnosis: Incomplete     Discharge Diagnosis: same    Procedures: Suction D&C    Complications: none    Discharge Condition: Good    Discharge Medications:      Discharge Medications        START taking these medications        Instructions Prescription details   ibuprofen 600 MG Tabs  Commonly known as: Motrin      Take 1 tablet (600 mg total) by mouth every 6 (six) hours as needed for Pain.   Quantity: 30 tablet  Refills: 0            CONTINUE taking these medications        Instructions Prescription details   prenatal vitamin with DHA 27-0.8-228 MG Caps      Take 1 capsule by mouth daily.   Refills: 0               Where to Get Your Medications        These medications were sent to [id number] - [place], [place] - [address] AT [address] [place] [address], [phone], [phone] [address], [facility] [address]      Phone: [phone]   ibuprofen 600 MG Tabs          Follow up Visits: Follow-up with Dr Conner in 2 weeks for post op exam      Angela Conner MD  2024  1:54 AM

## 2024-09-06 NOTE — ED QUICK NOTES
Report called to OR charge RN.  Pt changed out of remaining clothing and jewelry removed.  Pt and family aware of plan of care.  Pt being transported to the OR.

## 2024-09-06 NOTE — ED QUICK NOTES
Pt moved to room 12.  Pt helped to stand up and get changed out of her clothing.  When pt stood up, multiple kiwi sized clots fell to the floor along with other blood.  Pt began to feel lightheaded.  Pt was assisted back into bed, placed in supine position, and IV fluids started.  MD notified.  Clots picked up and placed into basin. Pt assisted with remainder of changing.

## 2024-09-06 NOTE — TELEPHONE ENCOUNTER
Received voice message from Dr. Malone to cancel patient's appointment for cytotec today. Patient had dilation and curettage with Dr. Conner last night. Appointment canceled.

## 2024-09-06 NOTE — ED PROVIDER NOTES
Patient Seen in: [facility] Emergency Department    History     Chief Complaint   Patient presents with    Pregnancy Issues       HPI    29-year-old female G2, P1 presents the ER with complaint of vaginal bleeding.  Patient who was recently diagnosed with a missed  on .  Patient states she has an appoint with her OB/GYN tomorrow for likely Cytotec to induce her miscarriage.  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  Patient's vital signs stable.  Patient states her pain currently 6 out of 10.    History reviewed.   Past Medical History:    Anemia    Was on Nexplanon for 1 year around age 17. Had bleeding for about 1 full year & became anemic.     Chlamydia    Decorative tattoo    Gestational hypertension (HCC)    discharged home on nifedipine 30 mg XR     History of prior pregnancy with SGA     Apparent undetected IUGR as infant measured just slightly SGA at birth. Delivery complicated by fetal bradycardia. Suspected partial placental abruption.     Placental abruption affecting delivery (HCC)    Apparently occurring during 2nd stage of labor with development of fetal bradycardia.        History reviewed. History reviewed. No pertinent surgical history.      Medications :  (Not in a hospital admission)       Family History   Problem Relation Age of Onset    No Known Problems Father     Other (GERD) Mother     Other (sickle trait) Daughter     Hypertension Maternal Grandmother     Diabetes Maternal Grandmother     Hypertension Maternal Grandfather     Diabetes Maternal Grandfather     Cancer Maternal Grandfather         KIDNEY CANCER, REMOVED ONE KIDNEY    Other (GERD) Maternal Grandfather     Hypertension Brother         in his 30s    Diabetes Brother     Other (Amputee from gunshot wound) Brother     Breast Cancer Neg     Ovarian Cancer Neg     Colon Cancer Neg     Endometriosis Neg     Infertility Neg     Uterine Cancer  Neg     Pancreatic Cancer Neg        Smoking Status:   Social History     Socioeconomic History    Marital status: Single   Tobacco Use    Smoking status: Never    Smokeless tobacco: Never   Vaping Use    Vaping status: Never Used   Substance and Sexual Activity    Alcohol use: Never    Drug use: Not Currently    Sexual activity: Not Currently     Birth control/protection: Abstinence       ROS  All pertinent positives for the review of systems are mentioned in the HPI  All other organ systems are reviewed and are negative.    Constitutional and vital signs reviewed.      Social History and Family History elements reviewed from today, pertinent positives to the presenting problem noted.    Physical Exam     ED Triage Vitals [09/05/24 2001]   /72   Pulse 100   Resp 20   Temp 97.8 °F (36.6 °C)   Temp src Temporal   SpO2 98 %   O2 Device None (Room air)       All measures to prevent infection transmission during my interaction with the patient were taken. The patient was already wearing a droplet mask on my arrival to the room. Personal protective equipment including droplet mask, eye protection, and gloves were worn throughout the duration of the exam.  Handwashing was performed prior to and after the exam.  Stethoscope and any equipment used during my examination was cleaned with super sani-cloth germicidal wipes following the exam.     Physical Exam  Constitutional:       Appearance: She is well-developed.   HENT:      Head: Normocephalic and atraumatic.      Right Ear: External ear normal.      Left Ear: External ear normal.      Nose: Nose normal.   Eyes:      Conjunctiva/sclera: Conjunctivae normal.      Pupils: Pupils are equal, round, and reactive to light.   Cardiovascular:      Rate and Rhythm: Normal rate and regular rhythm.      Heart sounds: Normal heart sounds.   Pulmonary:      Effort: Pulmonary effort is normal.      Breath sounds: Normal breath sounds.   Abdominal:      General: Bowel sounds are  normal.      Palpations: Abdomen is soft.   Genitourinary:     Vagina: Normal.   Musculoskeletal:         General: Normal range of motion.      Cervical back: Normal range of motion and neck supple.   Skin:     General: Skin is warm and dry.   Neurological:      Mental Status: She is alert and oriented to person, place, and time.      Deep Tendon Reflexes: Reflexes are normal and symmetric.   Psychiatric:         Behavior: Behavior normal.         Thought Content: Thought content normal.         Judgment: Judgment normal.         ED Course        Labs Reviewed   CBC WITH DIFFERENTIAL WITH PLATELET - Abnormal; Notable for the following components:       Result Value    RBC 3.36 (*)     HGB 10.6 (*)     HCT 30.9 (*)     All other components within normal limits   HCG, BETA SUBUNIT (QUANT PREGNANCY TEST) - Abnormal; Notable for the following components:    Hcg Quantitative 1,727.1 (*)     All other components within normal limits   BASIC METABOLIC PANEL (8) - Abnormal; Notable for the following components:    Glucose 111 (*)     All other components within normal limits   HEMOGLOBIN + HEMATOCRIT - Abnormal; Notable for the following components:    HGB 8.4 (*)     HCT 23.2 (*)     All other components within normal limits   TYPE AND SCREEN   RAINBOW DRAW LIGHT GREEN   RAINBOW DRAW BLUE         Imaging Results Available and Reviewed while in ED: US OB     IMPRESSION:  Irregular elongated 4 x 1.3 x 1.4 cm debris-filled gestational sac in the lower uterine segment, migrated from prior fundal position.  No identifiable fetal pole.  There is a large amount of heterogeneous blood clot within the endocervical canal directly abutting the irregular gestational sac.  The fundal endometrium measures 5 mm.  Findings are compatible with pregnancy loss with retained passing products of conception.    No visualized free fluid in the pelvis.    ED Medications Administered:   Medications   sodium chloride 0.9 % IV bolus 1,000 mL (1,000  mL Intravenous New Bag 24 2345)   morphINE PF 4 MG/ML injection 4 mg (4 mg Intravenous Given 24 2105)   sodium chloride 0.9 % IV bolus 1,000 mL (0 mL Intravenous Stopped 24 2225)   morphINE PF 4 MG/ML injection 4 mg (4 mg Intravenous Given 24 2312)         MDM     Vitals:    24 2129 24 2230 24 2300 24 2324   BP: 97/68 122/67 137/77 100/58   Pulse: 86 85 90 81   Resp: 20 20 24 20   Temp:       TempSrc:       SpO2: 98% 97% 100% 98%     *I personally reviewed and interpreted all ED vitals.  I also personally reviewed all labs and imaging if ordered    Pulse Ox: 98%, Room air, Normal     Monitor Interpretation:   normal sinus rhythm    Differential Diagnosis/ Diagnostic Considerations: Active miscarriage, threatened AB, complete     Medical Record Review: I personally reviewed available prior medical records for any recent pertinent discharge summaries, testing, and procedures and reviewed those reports.    Complicating Factors: The patient already has does not have any pertinent problems on file. to contribute to the complexity of this ED evaluation.    Medical Decision Making  -physician called to the exam room.  Patient went to the bathroom and passed a large amount of clots and felt lightheaded.  Patient given a liter of IV fluid and placed in Trendelenburg.  Patient's vital signs stable    [age]-year-old female G2, P1 presents ER with complaint of vaginal bleeding.  Patient continued to have passed heavy clots during ER stay.  Patient's vital signs stable but blood pressure now 158.  Repeat CBC drawn.  Patient with 2 unit blood loss over the last 3 hours discussed with Dr. Conner who states patient likely needs D&C.  Dr. Conner states she will see the patient in the ER taken to the OR for D&C tonight.  Patient's family member bedside made aware of the disposition and admission.    Total Critical Care Time: 31 minutes including time spent examining and re-evaluating  the patient, ordering and reviewing laboratory tests, documenting, reviewing previous records, obtaining information from the family, and speaking with consultants, admitting doctors, nurses and medics and excludes any time spent on procedures.      Problems Addressed:  Acute blood loss anemia: acute illness or injury  Incomplete spontaneous  (HCC): acute illness or injury    Amount and/or Complexity of Data Reviewed  Independent Historian:      Details: Medical history obtained from family was bedside states that she started bleeding while she was at work earlier today.  Labs: ordered. Decision-making details documented in ED Course.  Radiology: ordered and independent interpretation performed. Decision-making details documented in ED Course.     Details: Pelvic ultrasound reviewed by myself.  Radiology read shows retained products of conception with large amounts of blood in the uterus        Condition upon leaving the department: Stable    Disposition and Plan     Clinical Impression:  1. Incomplete spontaneous  (HCC)    2. Acute blood loss anemia        Disposition:  Send to or/cath/gi    Follow-up:  No follow-up provider specified.    Medications Prescribed:  Current Discharge Medication List

## 2024-09-06 NOTE — PLAN OF CARE
Problem: Patient Centered Care  Goal: Patient preferences are identified and integrated in the patient's plan of care  Description: Interventions:  - What would you like us to know as we care for you? From home with her life partner, him and her sister are her support system.  Patient works here in OR as a technician.  - Provide timely, complete, and accurate information to patient/family  - Incorporate patient and family knowledge, values, beliefs, and cultural backgrounds into the planning and delivery of care  - Encourage patient/family to participate in care and decision-making at the level they choose  - Honor patient and family perspectives and choices  Outcome: Adequate for Discharge     Problem: Patient/Family Goals  Goal: Patient/Family Long Term Goal  Description: Patient's Long Term Goal: Will have no complications from surgery.    Interventions:  - Up as tolerated.  - No heavy lifting.  - May shower, no tub bathing nor pools.  - Report any severe bleeding, more than regular menstruation.  - Follow up with OB as recommended.  - May take oral pain meds recommended.  - See additional Care Plan goals for specific interventions  Outcome: Adequate for Discharge  Goal: Patient/Family Short Term Goal  Description: Patient's Short Term Goal: Home when stable.    Interventions:   - Up as tolerated.  - SCD's to both legs when in bed to prevent blood clots.  - Pain management with oral medication.  - Monitor for severe vaginal bleeding.  - See additional Care Plan goals for specific interventions  Outcome: Adequate for Discharge     Problem: PAIN - ADULT  Goal: Verbalizes/displays adequate comfort level or patient's stated pain goal  Description: INTERVENTIONS:  - Encourage pt to monitor pain and request assistance  - Assess pain using appropriate pain scale  - Administer analgesics based on type and severity of pain and evaluate response  - Implement non-pharmacological measures as appropriate and evaluate  response  - Consider cultural and social influences on pain and pain management  - Manage/alleviate anxiety  - Utilize distraction and/or relaxation techniques  - Monitor for opioid side effects  - Notify MD/LIP if interventions unsuccessful or patient reports new pain  - Anticipate increased pain with activity and pre-medicate as appropriate  Outcome: Adequate for Discharge     Problem: HEMATOLOGIC - ADULT  Goal: Maintains hematologic stability  Description: INTERVENTIONS  - Assess for signs and symptoms of bleeding or hemorrhage  - Monitor labs and vital signs for trends  - Administer supportive blood products/factors, fluids and medications as ordered and appropriate  - Administer supportive blood products/factors as ordered and appropriate  Outcome: Adequate for Discharge  Goal: Free from bleeding injury  Description: (Example usage: patient with low platelets)  INTERVENTIONS:  - Avoid intramuscular injections, enemas and rectal medication administration  - Ensure safe mobilization of patient  - Hold pressure on venipuncture sites to achieve adequate hemostasis  - Assess for signs and symptoms of internal bleeding  - Monitor lab trends  - Patient is to report abnormal signs of bleeding to staff  - Avoid use of toothpicks and dental floss  - Use electric shaver for shaving  - Use soft bristle tooth brush  - Limit straining and forceful nose blowing  Outcome: Adequate for Discharge    Patient is alert and oriented, aware to call for help as needed.  Patient is currently in room air, denies shortness of breathing nor chest pain.  Patient denies nay severe bleeding vaginally.  Patient is denying pain neither.  Patient is voiding well, Passing gas but denies having bowel movement.  Patient will be going home today.

## 2024-09-06 NOTE — ED INITIAL ASSESSMENT (HPI)
Patient presents to ED 12 weeks pregnant with cramping and heavy vaginal bleeding that started 1 hour ago

## 2024-09-06 NOTE — ANESTHESIA POSTPROCEDURE EVALUATION
Patient: Sheila Cheung    Procedure Summary       Date: 24 Room / Location: [facility] MAIN OR  [facility] MAIN OR    Anesthesia Start: 115 Anesthesia Stop: 149    Procedure: DILATION & CURETTAGE SUCTION (Vagina ) Diagnosis:       Incomplete  (HCC)      (Incomplete  (HCC) [O03.4])    Surgeons: Angela Conner MD Anesthesiologist: Daljit Martinez MD    Anesthesia Type: general ASA Status: 2 - Emergent            Anesthesia Type: general    Vitals Value Taken Time   /63 24 0149   Temp  24 0149   Pulse 92 24 0149   Resp 14 24 0149   SpO2 98 24 014       [facility] AN Post Evaluation:   Patient Evaluated in PACU  Patient Participation: complete - patient participated  Level of Consciousness: awake  Pain Management: adequate  Airway Patency:patent  Yes    Nausea/Vomiting: none  Cardiovascular Status: acceptable  Respiratory Status: acceptable  Postoperative Hydration acceptable      Daljit Martinez MD  2024 1:49 AM

## 2024-09-06 NOTE — ANESTHESIA PROCEDURE NOTES
Airway  Date/Time: 9/6/2024 1:20 AM  Urgency: Elective    Airway not difficult    General Information and Staff    Patient location during procedure: OR  Anesthesiologist: Daljit Martinez MD  Performed: anesthesiologist   Performed by: Daljit Martinez MD  Authorized by: Daljit Martinez MD      Indications and Patient Condition  Indications for airway management: anesthesia  Sedation level: deep  Preoxygenated: yes  Patient position: sniffing  Mask difficulty assessment: 1 - vent by mask    Final Airway Details  Final airway type: endotracheal airway      Successful airway: ETT  Cuffed: yes   Successful intubation technique: Video laryngoscopy  Facilitating devices/methods: intubating stylet  Endotracheal tube insertion site: oral  Blade: GlideScope  Blade size: #3  ETT size (mm): 7.0    Cormack-Lehane Classification: grade I - full view of glottis  Placement verified by: capnometry   Measured from: teeth  Number of attempts at approach: 1

## 2024-09-06 NOTE — H&P
Pre-Operative History and Physical        HPI:  Sheila Cheung is a 29 year old  Patient's last menstrual period was 2024 (exact date).    Was seen for her New Prenatal visit on 24.  EDC 3/20/24 by LMP--  EGA 12 week.  Ob ultrasound on 24-- 6+ week IUP with ?FHT.   Repeat ultrasound on 24-- 6+ week pregnancy with no FHT.   Was doing expectant management.  Had some spotting last week.   Has appointment  for cytotec placement.    Presents to ER with heavy bleeding and large clots that started at 1930 today.  Bhcg 1727,   B+  Hgb 10.6 on 2100.   Repeat hgb 8.4 at 2358.  Pelvic ultrasound-- irregular elongated gestational sac in lower uterine segment.   Large amount of blood clots in endocervical canal        Past Medical History:    Anemia    Was on Nexplanon for 1 year around age 17. Had bleeding for about 1 full year & became anemic.     Chlamydia    Decorative tattoo    Gestational hypertension (HCC)    discharged home on nifedipine 30 mg XR     History of prior pregnancy with SGA     Apparent undetected IUGR as infant measured just slightly SGA at birth. Delivery complicated by fetal bradycardia. Suspected partial placental abruption.     Placental abruption affecting delivery (HCC)    Apparently occurring during 2nd stage of labor with development of fetal bradycardia.      History reviewed. No pertinent surgical history.  OB History    Para Term  AB Living   2 1 1     1   SAB IAB Ectopic Multiple Live Births         0 1      # Outcome Date GA Lbr Madhav/2nd Weight Sex Type Anes PTL Lv   2 Current            1 Term 20 39w3d 07:17 / 01:30 6 lb 1.7 oz (2.77 kg) F Vag-Vacuum EPI N KELECHI      Complications: Variable decelerations, Late decelerations, Fetal bradycardia, Abruptio Placenta      Obstetric Comments   2020 female \"[place]\" - late & variable decels, then fetal bradycardia during pushing. Outlet vacuum delivery for tones. Slightly  SGA infant. Suspect undetected IUGR, placental insufficiency, and partial placental abruption during labor.        Current Outpatient Medications:     prenatal vitamin with DHA 27-0.8-228 MG Oral Cap, Take 1 capsule by mouth daily., Disp: , Rfl:   No Known Allergies  Social History     Socioeconomic History    Marital status: Single     Spouse name: Not on file    Number of children: Not on file    Years of education: Not on file    Highest education level: Not on file   Occupational History    Not on file   Tobacco Use    Smoking status: Never    Smokeless tobacco: Never   Vaping Use    Vaping status: Never Used   Substance and Sexual Activity    Alcohol use: Never    Drug use: Not Currently    Sexual activity: Not Currently     Birth control/protection: Abstinence   Other Topics Concern    Not on file   Social History Narrative    Not on file     Social Determinants of Health     Financial Resource Strain: Low Risk  (8/19/2024)    Financial Resource Strain     Difficulty of Paying Living Expenses: Not hard at all     Med Affordability: No   Food Insecurity: No Food Insecurity (8/19/2024)    Food Insecurity     Food Insecurity: Never true   Transportation Needs: No Transportation Needs (8/19/2024)    Transportation Needs     Lack of Transportation: No     Car Seat: Not on file   Stress: No Stress Concern Present (8/19/2024)    Stress     Feeling of Stress : No   Recent Concern: Stress - Stress Concern Present (8/1/2024)    Stress     Feeling of Stress : Yes   Housing Stability: Low Risk  (8/19/2024)    Housing Stability     Housing Instability: No     Housing Instability Emergency: Not on file     Crib or Bassinette: Not on file         Physical Exam:  Vitals:    09/05/24 2230 09/05/24 2300 09/05/24 2324 09/06/24 0000   BP: 122/67 137/77 100/58 120/72   Pulse: 85 90 81 80   Resp: 20 24 20 18   Temp:       TempSrc:       SpO2: 97% 100% 98% 97%     /72   Pulse 80   Temp 97.8 °F (36.6 °C) (Temporal)   Resp 18    LMP 2024 (Exact Date)   SpO2 97%   General: NAD  Abdomen: soft, nontender, nondistended; no hernias nor masses    Pelvic exam deferred to OR      IMPRESSION:  Incomplete     PLAN:  Suction D&C    Risks, benefits, and alternatives discussed with the patient.  Risks including, but not limited to bleeding, infection, uterine perforation were reviewed.  All questions were answered.

## 2024-09-06 NOTE — PLAN OF CARE
Patient admitted overnight from PACU. Patient is A&Ox4. RA. On full liquid diet. Will be a check void by 9am. Patient resting in bed, denying pain. Call light within reach, frequent rounding. Safety measures in place. Plan for home when medically clear.     Problem: Patient Centered Care  Goal: Patient preferences are identified and integrated in the patient's plan of care  Description: Interventions:  - What would you like us to know as we care for you?   - Provide timely, complete, and accurate information to patient/family  - Incorporate patient and family knowledge, values, beliefs, and cultural backgrounds into the planning and delivery of care  - Encourage patient/family to participate in care and decision-making at the level they choose  - Honor patient and family perspectives and choices  Outcome: Progressing     Problem: Patient/Family Goals  Goal: Patient/Family Long Term Goal  Description: Patient's Long Term Goal:     Interventions:  -   - See additional Care Plan goals for specific interventions  Outcome: Progressing  Goal: Patient/Family Short Term Goal  Description: Patient's Short Term Goal:     Interventions:   -  - See additional Care Plan goals for specific interventions  Outcome: Progressing

## 2024-09-08 ENCOUNTER — WALK IN (OUTPATIENT)
Dept: URGENT CARE | Age: 29
End: 2024-09-08
Attending: EMERGENCY MEDICINE

## 2024-09-08 ENCOUNTER — HOSPITAL ENCOUNTER (EMERGENCY)
Facility: HOSPITAL | Age: 29
Discharge: HOME | End: 2024-09-08
Attending: EMERGENCY MEDICINE
Payer: MEDICAID

## 2024-09-08 VITALS
HEART RATE: 98 BPM | DIASTOLIC BLOOD PRESSURE: 79 MMHG | RESPIRATION RATE: 16 BRPM | WEIGHT: 170 LBS | TEMPERATURE: 98.4 F | SYSTOLIC BLOOD PRESSURE: 113 MMHG | OXYGEN SATURATION: 100 %

## 2024-09-08 VITALS
WEIGHT: 170 LBS | SYSTOLIC BLOOD PRESSURE: 111 MMHG | HEART RATE: 83 BPM | OXYGEN SATURATION: 100 % | TEMPERATURE: 99 F | RESPIRATION RATE: 19 BRPM | DIASTOLIC BLOOD PRESSURE: 80 MMHG | HEIGHT: 60 IN | BODY MASS INDEX: 33.38 KG/M2

## 2024-09-08 DIAGNOSIS — R51.9 ACUTE NONINTRACTABLE HEADACHE, UNSPECIFIED HEADACHE TYPE: Primary | ICD-10-CM

## 2024-09-08 DIAGNOSIS — D64.9 ANEMIA, UNSPECIFIED TYPE: ICD-10-CM

## 2024-09-08 LAB
ALBUMIN SERPL-MCNC: 4 G/DL (ref 3.2–4.8)
ALBUMIN/GLOB SERPL: 1.8 {RATIO} (ref 1–2)
ALP LIVER SERPL-CCNC: 42 U/L
ALT SERPL-CCNC: 15 U/L
ANION GAP SERPL CALC-SCNC: 8 MMOL/L (ref 0–18)
ANTIBODY SCREEN: NEGATIVE
AST SERPL-CCNC: 17 U/L (ref ?–34)
BASOPHILS # BLD AUTO: 0.02 X10(3) UL (ref 0–0.2)
BASOPHILS NFR BLD AUTO: 0.2 %
BILIRUB SERPL-MCNC: 0.3 MG/DL (ref 0.3–1.2)
BILIRUB UR QL: NEGATIVE
BUN BLD-MCNC: 8 MG/DL (ref 9–23)
BUN/CREAT SERPL: 10.8 (ref 10–20)
CALCIUM BLD-MCNC: 9 MG/DL (ref 8.7–10.4)
CHLORIDE SERPL-SCNC: 112 MMOL/L (ref 98–112)
CLARITY UR: CLEAR
CO2 SERPL-SCNC: 23 MMOL/L (ref 21–32)
CREAT BLD-MCNC: 0.74 MG/DL
DEPRECATED RDW RBC AUTO: 45.1 FL (ref 35.1–46.3)
EGFRCR SERPLBLD CKD-EPI 2021: 112 ML/MIN/1.73M2 (ref 60–?)
EOSINOPHIL # BLD AUTO: 0.04 X10(3) UL (ref 0–0.7)
EOSINOPHIL NFR BLD AUTO: 0.5 %
ERYTHROCYTE [DISTWIDTH] IN BLOOD BY AUTOMATED COUNT: 13.6 % (ref 11–15)
GLOBULIN PLAS-MCNC: 2.2 G/DL (ref 2–3.5)
GLUCOSE BLD-MCNC: 96 MG/DL (ref 70–99)
GLUCOSE UR-MCNC: NORMAL MG/DL
HCT VFR BLD AUTO: 20.3 %
HGB BLD-MCNC: 7.2 G/DL
IMM GRANULOCYTES # BLD AUTO: 0.08 X10(3) UL (ref 0–1)
IMM GRANULOCYTES NFR BLD: 0.9 %
KETONES UR-MCNC: NEGATIVE MG/DL
LEUKOCYTE ESTERASE UR QL STRIP.AUTO: NEGATIVE
LYMPHOCYTES # BLD AUTO: 2.14 X10(3) UL (ref 1–4)
LYMPHOCYTES NFR BLD AUTO: 24.6 %
MCH RBC QN AUTO: 33 PG (ref 26–34)
MCHC RBC AUTO-ENTMCNC: 35.5 G/DL (ref 31–37)
MCV RBC AUTO: 93.1 FL
MONOCYTES # BLD AUTO: 0.51 X10(3) UL (ref 0.1–1)
MONOCYTES NFR BLD AUTO: 5.9 %
NEUTROPHILS # BLD AUTO: 5.91 X10 (3) UL (ref 1.5–7.7)
NEUTROPHILS # BLD AUTO: 5.91 X10(3) UL (ref 1.5–7.7)
NEUTROPHILS NFR BLD AUTO: 67.9 %
NITRITE UR QL STRIP.AUTO: NEGATIVE
OSMOLALITY SERPL CALC.SUM OF ELEC: 294 MOSM/KG (ref 275–295)
PH UR: 6 [PH] (ref 5–8)
PLATELET # BLD AUTO: 212 10(3)UL (ref 150–450)
POTASSIUM SERPL-SCNC: 3.6 MMOL/L (ref 3.5–5.1)
PROT SERPL-MCNC: 6.2 G/DL (ref 5.7–8.2)
PROT UR-MCNC: NEGATIVE MG/DL
RBC # BLD AUTO: 2.18 X10(6)UL
RH BLOOD TYPE: POSITIVE
SARS-COV-2 RNA RESP QL NAA+PROBE: NOT DETECTED
SODIUM SERPL-SCNC: 143 MMOL/L (ref 136–145)
SP GR UR STRIP: 1.02 (ref 1–1.03)
UROBILINOGEN UR STRIP-ACNC: NORMAL
WBC # BLD AUTO: 8.7 X10(3) UL (ref 4–11)

## 2024-09-08 PROCEDURE — 99213 OFFICE O/P EST LOW 20 MIN: CPT

## 2024-09-08 PROCEDURE — 96361 HYDRATE IV INFUSION ADD-ON: CPT

## 2024-09-08 PROCEDURE — 10002803 HB RX 637: Performed by: EMERGENCY MEDICINE

## 2024-09-08 PROCEDURE — 85025 COMPLETE CBC W/AUTO DIFF WBC: CPT

## 2024-09-08 PROCEDURE — 80053 COMPREHEN METABOLIC PANEL: CPT

## 2024-09-08 PROCEDURE — 86900 BLOOD TYPING SEROLOGIC ABO: CPT | Performed by: EMERGENCY MEDICINE

## 2024-09-08 PROCEDURE — 86920 COMPATIBILITY TEST SPIN: CPT

## 2024-09-08 PROCEDURE — 86850 RBC ANTIBODY SCREEN: CPT | Performed by: EMERGENCY MEDICINE

## 2024-09-08 PROCEDURE — 99285 EMERGENCY DEPT VISIT HI MDM: CPT

## 2024-09-08 PROCEDURE — 36430 TRANSFUSION BLD/BLD COMPNT: CPT

## 2024-09-08 PROCEDURE — 81001 URINALYSIS AUTO W/SCOPE: CPT | Performed by: EMERGENCY MEDICINE

## 2024-09-08 PROCEDURE — 80053 COMPREHEN METABOLIC PANEL: CPT | Performed by: EMERGENCY MEDICINE

## 2024-09-08 PROCEDURE — 96375 TX/PRO/DX INJ NEW DRUG ADDON: CPT

## 2024-09-08 PROCEDURE — 96374 THER/PROPH/DIAG INJ IV PUSH: CPT

## 2024-09-08 PROCEDURE — 86901 BLOOD TYPING SEROLOGIC RH(D): CPT | Performed by: EMERGENCY MEDICINE

## 2024-09-08 PROCEDURE — 85025 COMPLETE CBC W/AUTO DIFF WBC: CPT | Performed by: EMERGENCY MEDICINE

## 2024-09-08 RX ORDER — METOCLOPRAMIDE 10 MG/1
10 TABLET ORAL 3 TIMES DAILY PRN
Qty: 10 TABLET | Refills: 0 | Status: SHIPPED | OUTPATIENT
Start: 2024-09-08

## 2024-09-08 RX ORDER — ONDANSETRON 4 MG/1
4 TABLET, ORALLY DISINTEGRATING ORAL ONCE
Status: COMPLETED | OUTPATIENT
Start: 2024-09-08 | End: 2024-09-08

## 2024-09-08 RX ORDER — METOCLOPRAMIDE HYDROCHLORIDE 5 MG/ML
10 INJECTION INTRAMUSCULAR; INTRAVENOUS ONCE
Status: COMPLETED | OUTPATIENT
Start: 2024-09-08 | End: 2024-09-08

## 2024-09-08 RX ORDER — KETOROLAC TROMETHAMINE 15 MG/ML
15 INJECTION, SOLUTION INTRAMUSCULAR; INTRAVENOUS ONCE
Status: COMPLETED | OUTPATIENT
Start: 2024-09-08 | End: 2024-09-08

## 2024-09-08 RX ORDER — DIPHENHYDRAMINE HYDROCHLORIDE 50 MG/ML
25 INJECTION INTRAMUSCULAR; INTRAVENOUS ONCE
Status: COMPLETED | OUTPATIENT
Start: 2024-09-08 | End: 2024-09-08

## 2024-09-08 RX ADMIN — ONDANSETRON 4 MG: 4 TABLET, ORALLY DISINTEGRATING ORAL at 15:13

## 2024-09-08 ASSESSMENT — PAIN SCALES - GENERAL
PAINLEVEL_OUTOF10: 7
PAINLEVEL: 7

## 2024-09-08 NOTE — ED PROVIDER NOTES
Patient Seen in: [facility] Emergency Department      History     Chief Complaint   Patient presents with    Headache    Nausea/Vomiting/Diarrhea     Stated Complaint: Migraine; Miscarriage on     Subjective:   HPI    29-year-old female with history of anemia presents with complaints of headache, weakness, and nausea after a D&C 2 days ago.  The patient was admitted from the emergency department for an incomplete  with heavy vaginal bleeding.  She had a suction D&C in the early morning hours of .  She was discharged later that day.  She reports complaints of diffuse headache, nausea, and generalized weakness since her discharge from the hospital.  She reports occasional chills but denies any measured fever.  She denies any focal weakness or numbness.  She denies neck pain or stiffness.  She has taken ibuprofen and Tylenol at home without relief of the headache.  She reports continued vaginal bleeding which she describes as heavy spotting.  She reports using 4-5 pads yesterday and 3 today.    Objective:   Past Medical History:    Anemia    Was on Nexplanon for 1 year around age 17. Had bleeding for about 1 full year & became anemic.     Chlamydia    Decorative tattoo    Gestational hypertension (HCC)    discharged home on nifedipine 30 mg XR     History of prior pregnancy with SGA     Apparent undetected IUGR as infant measured just slightly SGA at birth. Delivery complicated by fetal bradycardia. Suspected partial placental abruption.     Placental abruption affecting delivery (HCC)    Apparently occurring during 2nd stage of labor with development of fetal bradycardia.               History reviewed. No pertinent surgical history.             Social History     Socioeconomic History    Marital status: Single   Tobacco Use    Smoking status: Never    Smokeless tobacco: Never   Vaping Use    Vaping status: Never Used   Substance and Sexual Activity    Alcohol use: Never    Drug use: Not  Currently    Sexual activity: Not Currently     Birth control/protection: Abstinence     Social Determinants of Health     Financial Resource Strain: Low Risk  (8/19/2024)    Financial Resource Strain     Difficulty of Paying Living Expenses: Not hard at all     Med Affordability: No   Food Insecurity: No Food Insecurity (9/6/2024)    Food Insecurity     Food Insecurity: Never true   Transportation Needs: No Transportation Needs (9/6/2024)    Transportation Needs     Lack of Transportation: No   Stress: No Stress Concern Present (8/19/2024)    Stress     Feeling of Stress : No   Recent Concern: Stress - Stress Concern Present (8/1/2024)    Stress     Feeling of Stress : Yes   Housing Stability: Low Risk  (9/6/2024)    Housing Stability     Housing Instability: No              Review of Systems    Positive for stated Chief Complaint: Headache and Nausea/Vomiting/Diarrhea    Other systems are as noted in HPI.  Constitutional and vital signs reviewed.      All other systems reviewed and negative except as noted above.    Physical Exam     ED Triage Vitals   BP 09/08/24 1605 122/79   Pulse 09/08/24 1605 97   Resp 09/08/24 1605 18   Temp 09/08/24 1605 98.4 °F (36.9 °C)   Temp src 09/08/24 1925 Temporal   SpO2 09/08/24 1605 100 %   O2 Device 09/08/24 1700 None (Room air)       Current Vitals:   Vital Signs  BP: 103/65  Pulse: 77  Resp: 14  Temp: 99.1 °F (37.3 °C)  Temp src: Temporal  MAP (mmHg): 77    Oxygen Therapy  SpO2: 98 %  O2 Device: None (Room air)            Physical Exam      General Appearance: alert, no distress  Eyes: pupils equal and round no pallor or injection  ENT, Mouth: mucous membranes moist.  Bilateral TMs and auditory canals normal-appearing.  Oropharynx normal-appearing.  Respiratory: there are no retractions, lungs are clear to auscultation  Cardiovascular: regular rate and rhythm  Gastrointestinal:  abdomen is soft with mild suprapubic tenderness, no masses, bowel sounds normal  Neurological:  Speech normal.  Cranial nerves II through XII intact.  No focal motor or sensory deficits to extremities x 4.  Cerebellum intact finger-to-nose.  Skin: warm and dry, no rashes.  Musculoskeletal: neck is supple non tender        Extremities are symmetrical, full range of motion  Psychiatric: patient is oriented X 3, there is no agitation      ED Course     Labs Reviewed   CBC WITH DIFFERENTIAL WITH PLATELET - Abnormal; Notable for the following components:       Result Value    RBC 2.18 (*)     HGB 7.2 (*)     HCT 20.3 (*)     All other components within normal limits   COMP METABOLIC PANEL (14) - Abnormal; Notable for the following components:    BUN 8 (*)     All other components within normal limits   URINALYSIS WITH CULTURE REFLEX - Abnormal; Notable for the following components:    Blood Urine 2+ (*)     RBC Urine 3-5 (*)     Squamous Epi. Cells Few (*)     All other components within normal limits   RAPID SARS-COV-2 BY PCR - Normal   PREPARE RBC   TYPE AND SCREEN    Narrative:     The following orders were created for panel order Type and screen.  Procedure                               Abnormality         Status                     ---------                               -----------         ------                     ABORH (Blood Type)[[id number]]                               Final result               Antibody Screen[[id number]]                                  Final result                 Please view results for these tests on the individual orders.   ABORH (BLOOD TYPE)   ANTIBODY SCREEN   RAINBOW DRAW LAVENDER   RAINBOW DRAW LIGHT GREEN   RAINBOW DRAW BLUE   RAINBOW DRAW GOLD                    MDM      Patient reports much improvement in headache post medications.  Significant drop in hemoglobin as compared to her CBC upon arrival to the ED on 9/5.  She reports her bleeding has slowed and she is likely stabilizing.  Will transfuse a unit of PRBCs and will discharge the patient home with plans to follow-up  with her primary physician and gynecologist.  Discussed with Dr. Rodriges, covering for the patient's gynecologist.                                   Medical Decision Making      Disposition and Plan     Clinical Impression:  1. Acute nonintractable headache, unspecified headache type    2. Anemia, unspecified type         Disposition:  Discharge  9/8/2024  9:26 pm    Follow-up:  Angela Conner MD  [address] [place] [id number]  [phone]    Follow up            Medications Prescribed:  Current Discharge Medication List        START taking these medications    Details   metoclopramide 10 MG Oral Tab Take 1 tablet (10 mg total) by mouth 3 (three) times daily as needed (headache).  Qty: 10 tablet, Refills: 0

## 2024-09-09 NOTE — ED QUICK NOTES
Rounding Completed    Plan of Care reviewed. Waiting for blood to [place] up.    Bed is locked and in lowest position. Call light within reach.

## 2024-09-09 NOTE — DISCHARGE INSTRUCTIONS
Continue Tylenol or ibuprofen if headache recurs.  You may also take Reglan with ibuprofen for headache.  Follow-up with your primary physician and gynecologist.  Return to the emergency department if severe headache, greatly increased bleeding, increased lightheadedness, or other new symptoms develop.

## 2024-09-10 LAB
BLOOD TYPE BARCODE: 7300
UNIT VOLUME: 350 ML

## 2024-09-13 ENCOUNTER — TELEPHONE (OUTPATIENT)
Dept: OBGYN UNIT | Facility: HOSPITAL | Age: 29
End: 2024-09-13

## 2025-06-24 ENCOUNTER — OFFICE VISIT (OUTPATIENT)
Dept: FAMILY MEDICINE CLINIC | Facility: CLINIC | Age: 30
End: 2025-06-24
Payer: COMMERCIAL

## 2025-06-24 ENCOUNTER — APPOINTMENT (OUTPATIENT)
Dept: URGENT CARE | Age: 30
End: 2025-06-24
Attending: EMERGENCY MEDICINE

## 2025-06-24 VITALS
TEMPERATURE: 98 F | RESPIRATION RATE: 18 BRPM | DIASTOLIC BLOOD PRESSURE: 70 MMHG | SYSTOLIC BLOOD PRESSURE: 120 MMHG | HEART RATE: 89 BPM | OXYGEN SATURATION: 98 % | WEIGHT: 155 LBS | BODY MASS INDEX: 30 KG/M2

## 2025-06-24 DIAGNOSIS — J06.9 VIRAL UPPER RESPIRATORY ILLNESS: Primary | ICD-10-CM

## 2025-06-24 LAB
OPERATOR ID: NORMAL
POCT LOT NUMBER: NORMAL
RAPID SARS-COV-2 BY PCR: NOT DETECTED

## 2025-06-24 PROCEDURE — 99202 OFFICE O/P NEW SF 15 MIN: CPT | Performed by: NURSE PRACTITIONER

## 2025-06-24 PROCEDURE — U0002 COVID-19 LAB TEST NON-CDC: HCPCS | Performed by: NURSE PRACTITIONER

## 2025-06-24 NOTE — PROGRESS NOTES
CHIEF COMPLAINT:     Chief Complaint   Patient presents with    Upper Respiratory Infection     X 2 days  Sx: sinus pressure, post nasal drip, burning sensation in nose/throat, cough        HPI:   Sheila Cheung is a 30 year old female who presents for ill symptoms for 2 days. Patient reports sinus pressure, pnd, cough. Denies ear pain, fever. Symptoms have been worsening since onset.  Treating symptoms with mucinex, benadryl yesterday. Nothing taken today. Reports didn't really help symptoms.      Current Medications[1]   Past Medical History[2]   Past Surgical History[3]      Short Social Hx on File[4]      REVIEW OF SYSTEMS:   GENERAL: feels well otherwise, good appetite  SKIN: no rashes or abnormal skin lesions  HEENT: See HPI  LUNGS: denies shortness of breath or wheezing, See HPI  CARDIOVASCULAR: denies chest pain or palpitations   GI: denies N/V/C or abdominal pain  NEURO: Denies headaches    EXAM:   /70   Pulse 89   Temp 97.6 °F (36.4 °C)   Resp 18   Wt 155 lb (70.3 kg)   LMP 05/21/2025 (Exact Date)   SpO2 98%   Breastfeeding No   BMI 30.27 kg/m²   GENERAL: well developed, well nourished, in no apparent distress  SKIN: no rashes, no suspicious lesions  HEENT: atraumatic, normocephalic. conjunctiva clear. TM's gray, no bulging, no retraction, + fluid, bony landmarks intact. clear nasal discharge, nasal mucosa erythematous and swollen. Oral mucosa pink, moist. Posterior pharynx is not erythematous. no exudates. Tonsils 1/4. no Sinus tenderness with palpation.   THROAT:NECK: Supple, non-tender  LUNGS: clear to auscultation   CARDIO: RRR without murmur  EXTREMITIES: no cyanosis, clubbing or edema  LYMP: bilateral anterior cervical lymphadenopathy.    ASSESSMENT AND PLAN:   Sheila Cheung is a 30 year old female who presents with     Sheila was seen today for upper respiratory infection.    Diagnoses and all orders for this visit:    Viral upper respiratory  illness  -     Rapid Covid-19     Rapid covid negative.   Continue supportive care.     Risks, benefits, and side effects of medication explained and discussed.    Discussed physical exam and hpi with pt. No bacterial focus noted on exam. Pt has reassuring physical exam consistent with viral uri. Lungs clear bilat. No respiratory distress noted. Treatment options discussed with patient and explained in detail. We reviewed symptomatic care at home. The risks, benefits and potential side effects of possible medications were reviewed. Alternatives were discussed. Monitoring parameters and expected course outlined. Patient to call PCP or go to emergency department if symptoms fail to respond as outlined, or worsen in any way. The patient agreed with the plan.  See Patient Handout    The patient indicates understanding of these issues and agrees to the plan.  The patient is asked to follow up with PCP if sx's persist or worsen.         [1]   Current Outpatient Medications   Medication Sig Dispense Refill    metoclopramide 10 MG Oral Tab Take 1 tablet (10 mg total) by mouth 3 (three) times daily as needed (headache). 10 tablet 0    ibuprofen 600 MG Oral Tab Take 1 tablet (600 mg total) by mouth every 6 (six) hours as needed for Pain. 30 tablet 0    acetaminophen 325 MG Oral Tab Take 2 tablets (650 mg total) by mouth every 6 (six) hours as needed. 30 tablet 0    prenatal vitamin with DHA 27-0.8-228 MG Oral Cap Take 1 capsule by mouth daily.     [2]   Past Medical History:   Anemia    Was on Nexplanon for 1 year around age 17. Had bleeding for about 1 full year & became anemic.     Chlamydia    Decorative tattoo    Gestational hypertension (HCC)    discharged home on nifedipine 30 mg XR     History of prior pregnancy with SGA     Apparent undetected IUGR as infant measured just slightly SGA at birth. Delivery complicated by fetal bradycardia. Suspected partial placental abruption.     Placental abruption affecting  delivery (HCC)    Apparently occurring during 2nd stage of labor with development of fetal bradycardia.    [3] No past surgical history on file.  [4]   Social History  Socioeconomic History    Marital status: Single   Tobacco Use    Smoking status: Never    Smokeless tobacco: Never   Vaping Use    Vaping status: Never Used   Substance and Sexual Activity    Alcohol use: Never    Drug use: Not Currently    Sexual activity: Not Currently     Birth control/protection: Abstinence     Social Drivers of Health     Food Insecurity: No Food Insecurity (9/6/2024)    Food Insecurity     Food Insecurity: Never true   Transportation Needs: No Transportation Needs (9/6/2024)    Transportation Needs     Lack of Transportation: No   Stress: No Stress Concern Present (8/19/2024)    Stress     Feeling of Stress : No   Recent Concern: Stress - Stress Concern Present (8/1/2024)    Stress     Feeling of Stress : Yes   Housing Stability: Low Risk  (9/6/2024)    Housing Stability     Housing Instability: No

## (undated) DEVICE — STRAP OR POS W3.5XL19IN FOAM STRRP W/ SLIP

## (undated) DEVICE — GLOVE SUR 6 SENSICARE PI MIC PIP CRM PWD F

## (undated) DEVICE — GLOVE SUR 6.5 SENSICARE PI MIC PIP CRM PWD F

## (undated) DEVICE — SYSTEM COLL CANSTR LID SEAL CAP W/O TISS TRAP

## (undated) DEVICE — SET COLL TBNG 3/8INX6FT W/ INTEGR SWVL

## (undated) DEVICE — SOLUTION IRRIG 1000ML 0.9% NACL USP BTL

## (undated) DEVICE — SYSTEM COLL W/ TISS TRAP INCLUDE COLL CANSTR

## (undated) DEVICE — PACK CUSTOM D

## (undated) DEVICE — CURETTE VAC 8MM CRV VACURET

## (undated) NOTE — LETTER
2020    Re: Garret Kirkpatrick  : 1995    To Whom It May Concern:  Sheila Conchis is under my care for pregnancy. Please excuse her from work on 2020 for an appointment.  She is able to return to work without restrictio

## (undated) NOTE — LETTER
August 11, 2020      Mariah Quarles [address] [facility]      Dear Shamika Ball: Our office has been trying to contact you to discuss your recent test results.   Please contact our office at your earliest convenience at

## (undated) NOTE — LETTER
8/23/2024              Sheila Cheung        [address]        [place] [address]         To Whom It May Concern,     Sheila Cheung is under my care. She was seen in my office 8/22/2024.    She is pregnant with Estimated Date of Delivery: 3/20/25.  She is permitted to work with the following conditions:  -access to restroom as needed  -access to water as needed  -access to protective gear and ventilated system while working with solvents    She also has lifting, push and pulling restrictions of nothing over 25 pounds.       Sincerely,    Arnol Malone, DO